# Patient Record
Sex: FEMALE | Race: BLACK OR AFRICAN AMERICAN | Employment: FULL TIME | URBAN - METROPOLITAN AREA
[De-identification: names, ages, dates, MRNs, and addresses within clinical notes are randomized per-mention and may not be internally consistent; named-entity substitution may affect disease eponyms.]

---

## 2017-04-01 ENCOUNTER — HOSPITAL ENCOUNTER (OUTPATIENT)
Dept: MAMMOGRAPHY | Age: 57
Discharge: HOME | End: 2017-04-01

## 2017-04-01 DIAGNOSIS — Z12.31 VISIT FOR SCREENING MAMMOGRAM: ICD-10-CM

## 2018-06-02 ENCOUNTER — HOSPITAL ENCOUNTER (OUTPATIENT)
Dept: MAMMOGRAPHY | Age: 58
Discharge: HOME | End: 2018-06-02
Attending: FAMILY MEDICINE | Admitting: FAMILY MEDICINE

## 2018-06-02 DIAGNOSIS — Z12.31 VISIT FOR SCREENING MAMMOGRAM: ICD-10-CM

## 2018-06-08 ENCOUNTER — HOSPITAL ENCOUNTER (OUTPATIENT)
Dept: MAMMOGRAPHY | Age: 58
Discharge: HOME | End: 2018-06-08
Attending: FAMILY MEDICINE | Admitting: FAMILY MEDICINE

## 2018-06-08 DIAGNOSIS — R92.8 ABNORMAL MAMMOGRAM: ICD-10-CM

## 2018-06-19 ENCOUNTER — TELEPHONE (OUTPATIENT)
Dept: SURGERY | Age: 58
End: 2018-06-19

## 2018-06-26 ENCOUNTER — OFFICE VISIT (OUTPATIENT)
Dept: SURGERY | Age: 58
End: 2018-06-26

## 2018-06-26 ENCOUNTER — TELEPHONE (OUTPATIENT)
Dept: SURGERY | Age: 58
End: 2018-06-26

## 2018-06-26 VITALS
HEART RATE: 75 BPM | DIASTOLIC BLOOD PRESSURE: 99 MMHG | WEIGHT: 241 LBS | OXYGEN SATURATION: 99 % | HEIGHT: 65 IN | SYSTOLIC BLOOD PRESSURE: 174 MMHG | BODY MASS INDEX: 40.15 KG/M2

## 2018-06-26 DIAGNOSIS — Z80.3 FAMILY HISTORY OF BREAST CANCER: ICD-10-CM

## 2018-06-26 DIAGNOSIS — Z91.89 AT HIGH RISK FOR BREAST CANCER: Primary | ICD-10-CM

## 2018-06-26 PROCEDURE — 99243 OFF/OP CNSLTJ NEW/EST LOW 30: CPT | Performed by: SURGERY

## 2018-06-26 RX ORDER — LISINOPRIL 40 MG/1
TABLET ORAL
COMMUNITY
Start: 2018-06-21

## 2018-06-26 ASSESSMENT — ENCOUNTER SYMPTOMS
VOMITING: 0
ABDOMINAL DISTENTION: 0
RECTAL PAIN: 0
NAUSEA: 0
SHORTNESS OF BREATH: 0
ABDOMINAL PAIN: 0
BACK PAIN: 0
TROUBLE SWALLOWING: 0
CONSTIPATION: 0
BLOOD IN STOOL: 0
DIARRHEA: 0
COUGH: 0
COLOR CHANGE: 0

## 2018-06-26 NOTE — TELEPHONE ENCOUNTER
Breast History:  History of Previous Breast Biopsy:no  Self Breast Exams Completed:yes-  Family History of Breast Cancer:yes-  Mother (48's)alive  and  sister (11) all   61 -66's   Age of Diagnosis:   Age of Death or Current Age:   Family History of Other Cancers:yes  Patient has history of colon cancer at 52   Age of Diagnosis:    Age of Death or Current Age:   Ash[employer]od[employer] Decent:no  Bra Size: 45 C    Gyne History:  : 2,   Para: 1  Age of Menarche: 15 years  Age of Menopause: N/A years   Age of first pregnancy: 12 years  Age of first live Birth: 12 years  Breast Feeding (total time): no  History of Hysterectomy / THERESA-BSO: age of 45  History of OCP's/HRT:Yes   When and how long:  Not long  History of Ovarian Cancer: no   What age:    Family History of Ovarian Cancer: no   Age of death or current age:   History of Gyne Surgery: yes- (hysterectomy)

## 2018-07-13 ENCOUNTER — TELEPHONE (OUTPATIENT)
Dept: SURGERY | Age: 58
End: 2018-07-13

## 2018-07-13 ENCOUNTER — HOSPITAL ENCOUNTER (OUTPATIENT)
Dept: MRI IMAGING | Age: 58
Discharge: HOME | End: 2018-07-13
Admitting: SURGERY

## 2018-07-13 DIAGNOSIS — Z80.3 FAMILY HISTORY OF BREAST CANCER: ICD-10-CM

## 2018-07-13 DIAGNOSIS — Z91.89 AT HIGH RISK FOR BREAST CANCER: ICD-10-CM

## 2018-07-13 DIAGNOSIS — Z91.89 OTHER SPECIFIED PERSONAL RISK FACTORS, NOT ELSEWHERE CLASSIFIED: ICD-10-CM

## 2018-07-13 RX ORDER — SODIUM CHLORIDE 9 MG/ML
INJECTION, SOLUTION INTRAVENOUS ONCE
Status: COMPLETED | OUTPATIENT
Start: 2018-07-13 | End: 2018-07-13

## 2018-07-13 RX ADMIN — SODIUM CHLORIDE: 9 INJECTION, SOLUTION INTRAVENOUS at 08:32

## 2019-06-15 ENCOUNTER — HOSPITAL ENCOUNTER (OUTPATIENT)
Dept: WOMENS IMAGING | Age: 59
Discharge: HOME | End: 2019-06-15
Payer: COMMERCIAL

## 2019-06-15 DIAGNOSIS — Z80.3 FAMILY HISTORY OF BREAST CANCER IN MOTHER: ICD-10-CM

## 2019-06-15 DIAGNOSIS — Z12.39 BREAST CANCER SCREENING: ICD-10-CM

## 2019-06-15 DIAGNOSIS — Z80.3 FAMILY HISTORY OF MALIGNANT NEOPLASM OF BREAST: ICD-10-CM

## 2019-06-15 PROCEDURE — 77063 BREAST TOMOSYNTHESIS BI: CPT

## 2020-10-07 ENCOUNTER — HOSPITAL ENCOUNTER (OUTPATIENT)
Dept: WOMENS IMAGING | Age: 60
Discharge: HOME | End: 2020-10-07
Payer: COMMERCIAL

## 2020-10-07 PROCEDURE — 77067 SCR MAMMO BI INCL CAD: CPT

## 2021-12-16 ENCOUNTER — HOSPITAL ENCOUNTER (OUTPATIENT)
Dept: WOMENS IMAGING | Age: 61
Discharge: HOME | End: 2021-12-16
Payer: COMMERCIAL

## 2021-12-16 VITALS — WEIGHT: 235 LBS | BODY MASS INDEX: 36.88 KG/M2 | HEIGHT: 67 IN

## 2021-12-16 DIAGNOSIS — Z12.31 VISIT FOR SCREENING MAMMOGRAM: ICD-10-CM

## 2021-12-16 PROCEDURE — 77067 SCR MAMMO BI INCL CAD: CPT

## 2022-05-24 ENCOUNTER — TELEPHONE (OUTPATIENT)
Dept: CARDIOLOGY CLINIC | Age: 62
End: 2022-05-24

## 2022-05-24 NOTE — TELEPHONE ENCOUNTER
MIREILLE Paulson spoke with Dr Kim Joseph about setting up new patient appt for HTN, ventricular bigeminy on EKG. Dr Kim Joseph can see pt tomorrow 5/25 at 1230 pm at [facility] office. Called patient. No answer and no availability to leave message. Called  (on [employer]) and left message on voicemail offering appt. Asked they call office back.

## 2022-05-24 NOTE — TELEPHONE ENCOUNTER
Dr Jose Ratliff will be in meetings tomorrow morning. He has no morning availability coming up in the near future.

## 2022-05-24 NOTE — TELEPHONE ENCOUNTER
Called and spoke to the pt pt would like to know if she can come in earlier as she lives down the street from [facility] and she works in [employer].  Pt stated it would be easier come in on her way to work even if she comes in at 7:30am.    Pls advise thank you

## 2022-06-03 ENCOUNTER — OFFICE VISIT (OUTPATIENT)
Dept: CARDIOLOGY CLINIC | Age: 62
End: 2022-06-03
Payer: COMMERCIAL

## 2022-06-03 VITALS
WEIGHT: 247.4 LBS | HEART RATE: 69 BPM | HEIGHT: 67 IN | DIASTOLIC BLOOD PRESSURE: 90 MMHG | OXYGEN SATURATION: 99 % | SYSTOLIC BLOOD PRESSURE: 146 MMHG | BODY MASS INDEX: 38.83 KG/M2

## 2022-06-03 DIAGNOSIS — I49.3 PVC (PREMATURE VENTRICULAR CONTRACTION): ICD-10-CM

## 2022-06-03 DIAGNOSIS — G47.30 SLEEP APNEA, UNSPECIFIED TYPE: ICD-10-CM

## 2022-06-03 DIAGNOSIS — I45.10 RBBB: Primary | ICD-10-CM

## 2022-06-03 DIAGNOSIS — I10 PRIMARY HYPERTENSION: ICD-10-CM

## 2022-06-03 PROCEDURE — 93000 ELECTROCARDIOGRAM COMPLETE: CPT | Performed by: INTERNAL MEDICINE

## 2022-06-03 PROCEDURE — 99204 OFFICE O/P NEW MOD 45 MIN: CPT | Performed by: INTERNAL MEDICINE

## 2022-06-03 RX ORDER — ATORVASTATIN CALCIUM 20 MG/1
TABLET, FILM COATED ORAL
COMMUNITY
Start: 2022-05-03

## 2022-06-03 RX ORDER — MULTIVIT WITH CALCIUM,IRON,MIN
1 TABLET ORAL DAILY
COMMUNITY

## 2022-06-03 NOTE — PROGRESS NOTES
[facility]  Cardiac Consult     Referring Provider:  RASHIDA Saleh     Chief Complaint   Patient presents with    New Patient    Hypertension        History of Present Illness:  58 y.o. female seen in consultation at the request of Danielle Butts for HTN and abnormal EKG. She has a h/o obesity and HTN. Her BP is generally well controlled. She started taking GoLo to try to lose weight. After she was on the for one month she went to her PCP for a routine visit. She was found to be hypertensive. She had an associated headache. No other exacerbating or relieving factors. She also had an irregular heart beat so an EKG was performed showing RBBB with bigeminal PVC's. She was sent here for evaluation. She immediately stopped the Golo. He headache and heart skipping resolved. She now feels well. Mild dyspnea with exertion. She does have sleep apnea and uses a CPAP. Past Medical History:   has a past medical history of Cancer [facility]), [facility] and Hypertension. Surgical History:   has a past surgical history that includes Tunneled venous port placement; Cholecystectomy; Colon surgery (2009); and Hysterectomy. Social History:  Social History     Tobacco Use    Smoking status: Never Smoker    Smokeless tobacco: Never Used   Substance Use Topics    Alcohol use: No        Family History:  family history includes Breast Cancer in her maternal aunt, maternal aunt, maternal aunt, maternal aunt, maternal aunt, maternal aunt, maternal aunt, and mother. Allergies:  Patient has no known allergies. Home Medications:  Prior to Visit Medications    Medication Sig Taking?  Authorizing Provider   atorvastatin (LIPITOR) 20 MG tablet TAKE 1 TABLET BY MOUTH ONCE DAILY FOR 90 DAYS Yes Historical Provider, MD   Multiple Vitamins-Minerals (MULTIPLE VITAMINS/WOMENS) TABS Take 1 tablet by mouth daily Yes Historical Provider, MD   metoprolol tartrate (LOPRESSOR) 25 MG tablet TAKE 1 TABLET BY MOUTH TWICE DAILY FOR 30 DAYS Yes Historical Provider, MD   lisinopril (PRINIVIL;ZESTRIL) 40 MG tablet  Yes Historical Provider, MD   Vitamin D, Ergocalciferol, 52641 UNIT CAPS Take  by mouth. One tablet by mouth weekly for four weeks then once a month  Yes Historical Provider, MD       [x] Medications and dosages reviewed. ROS:  [x]Full ROS obtained and negative except as mentioned in HPI      PHYSICAL EXAMINATION:  Vitals:    06/03/22 1000 06/03/22 1010   BP: (!) 148/90 (!) 146/90   Site: Left Upper Arm Right Upper Arm   Position: Sitting Sitting   Cuff Size: Large Adult Large Adult   Pulse: 69    SpO2: 99%    Weight: 247 lb 6.4 oz (112.2 kg)    Height: 5' 7\" (1.702 m)         · GENERAL: Well developed, well nourished, No acute distress  · NEUROLOGICAL: Alert and oriented  · PSYCH: Calm affect  · SKIN: Warm and dry, No visible rash,   · EYES: Pupils equal and round, Sclera non-icteric,   · HENT:  External ears and nose unremarkable, mucus membranes moist  · MUSCULOSKELETAL: Normal cephalic, neck supple  · CAROTID: Normal upstroke, no bruits  · CARDIAC: JVP normal, Normal PMI, regular rate and rhythm, normal S1S2, no murmur, rub, or gallop  · RESPIRATORY: Normal respiratory effort, clear to auscultation bilaterally  · EXTREMITIES: No LEedema  · GASTROINTESTINAL: normal bowel sounds, soft, non-tender, No hepatomegaly     EKG  5/24- NSR, RBBB, Bigeminal PVC's  Today-NSR, RBBB      ASSESSMENT:    PVC's:  Resolved. Likely exacerbated by GoLo  Resolved with stopping Golo  Follow    HTN:  Borderline today  Has been normal at home  Follow on lisinopril and lopressor    Sleep Apnea:  Continue CPAP    RBBB/ SHAW:  With Sleep apnea.   Check ECHO for right heart issues        PLAN:  Same meds  Follow BP at home  Stay off of Golo  F/u with ECHO    Thank you for allowing me to participate in the care of this individual.      Maggie Gaytan M.D., [facility]

## 2023-01-24 ENCOUNTER — HOSPITAL ENCOUNTER (OUTPATIENT)
Dept: WOMENS IMAGING | Age: 63
Discharge: HOME | End: 2023-01-24
Payer: COMMERCIAL

## 2023-01-24 VITALS — WEIGHT: 220 LBS | BODY MASS INDEX: 34.53 KG/M2 | HEIGHT: 67 IN

## 2023-01-24 DIAGNOSIS — Z12.31 VISIT FOR SCREENING MAMMOGRAM: ICD-10-CM

## 2023-01-24 PROCEDURE — 77063 BREAST TOMOSYNTHESIS BI: CPT

## 2023-02-22 ENCOUNTER — TELEPHONE (OUTPATIENT)
Dept: WOMENS IMAGING | Age: 63
End: 2023-02-22

## 2023-02-22 ENCOUNTER — HOSPITAL ENCOUNTER (OUTPATIENT)
Dept: WOMENS IMAGING | Age: 63
Discharge: HOME | End: 2023-02-22
Payer: COMMERCIAL

## 2023-02-22 ENCOUNTER — PRE-PROCEDURE TELEPHONE (OUTPATIENT)
Dept: WOMENS IMAGING | Age: 63
End: 2023-02-22

## 2023-02-22 ENCOUNTER — HOSPITAL ENCOUNTER (OUTPATIENT)
Dept: ULTRASOUND IMAGING | Age: 63
Discharge: HOME | End: 2023-02-22
Payer: COMMERCIAL

## 2023-02-22 DIAGNOSIS — R92.8 ABNORMAL MAMMOGRAM: ICD-10-CM

## 2023-02-22 DIAGNOSIS — R92.8 OTHER ABNORMAL AND INCONCLUSIVE FINDINGS ON DIAGNOSTIC IMAGING OF BREAST: ICD-10-CM

## 2023-02-22 PROCEDURE — 76642 ULTRASOUND BREAST LIMITED: CPT

## 2023-02-22 PROCEDURE — G0279 TOMOSYNTHESIS, MAMMO: HCPCS

## 2023-03-01 ENCOUNTER — HOSPITAL ENCOUNTER (OUTPATIENT)
Dept: ULTRASOUND IMAGING | Age: 63
Discharge: HOME | End: 2023-03-01
Payer: COMMERCIAL

## 2023-03-01 DIAGNOSIS — N63.10 MASS OF RIGHT BREAST, UNSPECIFIED QUADRANT: ICD-10-CM

## 2023-03-01 PROCEDURE — 88305 TISSUE EXAM BY PATHOLOGIST: CPT

## 2023-03-01 PROCEDURE — 19083 BX BREAST 1ST LESION US IMAG: CPT

## 2023-03-01 PROCEDURE — 2500000003 HC RX 250 WO HCPCS: Performed by: NURSE PRACTITIONER

## 2023-03-01 RX ORDER — LIDOCAINE HYDROCHLORIDE 10 MG/ML
5 INJECTION, SOLUTION EPIDURAL; INFILTRATION; INTRACAUDAL; PERINEURAL ONCE
Status: COMPLETED | OUTPATIENT
Start: 2023-03-01 | End: 2023-03-01

## 2023-03-01 RX ORDER — LIDOCAINE HYDROCHLORIDE AND EPINEPHRINE BITARTRATE 20; .01 MG/ML; MG/ML
20 INJECTION, SOLUTION SUBCUTANEOUS ONCE
Status: COMPLETED | OUTPATIENT
Start: 2023-03-01 | End: 2023-03-01

## 2023-03-01 RX ADMIN — LIDOCAINE HYDROCHLORIDE ANHYDROUS 5 ML: 10 INJECTION, SOLUTION INFILTRATION at 13:22

## 2023-03-01 RX ADMIN — LIDOCAINE HYDROCHLORIDE,EPINEPHRINE BITARTRATE 10 ML: 20; .01 INJECTION, SOLUTION INFILTRATION; PERINEURAL at 13:21

## 2023-03-01 ASSESSMENT — PAIN SCALES - GENERAL: PAINLEVEL_OUTOF10: 3

## 2023-03-01 ASSESSMENT — PAIN DESCRIPTION - ORIENTATION: ORIENTATION: RIGHT

## 2023-03-01 ASSESSMENT — PAIN DESCRIPTION - LOCATION: LOCATION: BREAST

## 2023-03-01 NOTE — PROGRESS NOTES
Patient here for breast biopsy. iN reviewed the health history, allergies and medications. Family member,  Patience Nicely drove her. Radiologist reviews procedure with patient, consent signed. Patient tolerates procedure well. Compression held. Site cleansed with chloraprep, steri strips and dry dressing applied. Ice pack in place. Reviewed discharge instructions with patient and signed copy. Patient verbalized understanding and agreed to contact Nurse Navigator with any questions. Patient A&Ox3, steady on feet and discharged home.

## 2023-03-02 ENCOUNTER — HOSPITAL ENCOUNTER (OUTPATIENT)
Dept: WOMENS IMAGING | Age: 63
Discharge: HOME | End: 2023-03-02
Payer: COMMERCIAL

## 2023-03-02 DIAGNOSIS — Z98.890 STATUS POST RIGHT BREAST BIOPSY: ICD-10-CM

## 2023-03-02 PROCEDURE — 77065 DX MAMMO INCL CAD UNI: CPT

## 2023-03-06 ENCOUNTER — TELEPHONE (OUTPATIENT)
Dept: WOMENS IMAGING | Age: 63
End: 2023-03-06

## 2023-03-06 NOTE — TELEPHONE ENCOUNTER
Imaging Navigator reviewed results of breast biopsy which showed Benign fibroadipose tissue on the pathology report. Negative for atypia or malignancy. iN reviewed radiologist follow up recommendations as a diagnostic mammogram in 6 months.

## 2023-10-18 ENCOUNTER — HOSPITAL ENCOUNTER (OUTPATIENT)
Dept: ULTRASOUND IMAGING | Age: 63
Discharge: HOME | End: 2023-10-18
Payer: COMMERCIAL

## 2023-10-18 ENCOUNTER — HOSPITAL ENCOUNTER (OUTPATIENT)
Dept: WOMENS IMAGING | Age: 63
Discharge: HOME | End: 2023-10-18
Payer: COMMERCIAL

## 2023-10-18 DIAGNOSIS — R92.8 OTHER ABNORMAL AND INCONCLUSIVE FINDINGS ON DIAGNOSTIC IMAGING OF BREAST: ICD-10-CM

## 2023-10-18 PROCEDURE — G0279 TOMOSYNTHESIS, MAMMO: HCPCS

## 2023-10-18 PROCEDURE — 76642 ULTRASOUND BREAST LIMITED: CPT

## 2024-03-23 ENCOUNTER — HOSPITAL ENCOUNTER (OUTPATIENT)
Dept: WOMENS IMAGING | Age: 64
Discharge: HOME | End: 2024-03-23
Payer: COMMERCIAL

## 2024-03-23 VITALS — BODY MASS INDEX: 38.41 KG/M2 | HEIGHT: 66 IN | WEIGHT: 239 LBS

## 2024-03-23 DIAGNOSIS — Z12.31 SCREENING MAMMOGRAM FOR BREAST CANCER: ICD-10-CM

## 2024-03-23 PROCEDURE — 77063 BREAST TOMOSYNTHESIS BI: CPT

## 2024-11-01 ENCOUNTER — TELEPHONE (OUTPATIENT)
Dept: CARDIOLOGY CLINIC | Age: 64
End: 2024-11-01

## 2024-11-01 DIAGNOSIS — R00.2 PALPITATIONS: Primary | ICD-10-CM

## 2024-11-01 DIAGNOSIS — I49.3 FREQUENT PVCS: ICD-10-CM

## 2024-11-01 NOTE — TELEPHONE ENCOUNTER
Rabia called to speak w/Anca re: the Pt changes in her EKG. Rabia did upload a copy in the Pt chart.  Please call to discuss at:  [phone], option 1.  Thank you

## 2024-11-01 NOTE — TELEPHONE ENCOUNTER
Talked to patient and NP Vandana Redd. Patient has been on Adipex and is now hypertensive and having PVC's. No symptoms.    MMK saw 2 years ago with PVC's while on dietary supplement GOLO. She was to get echo and fu at that time but did not. She has been taking Adipex most recently for weight loss and again is having PVC's. EKG sent to us from PCP office.   MMK would like her to ave  2-3 day holter to quantitate PVC's.Wants echo and OV with him.      Spoke to patient. She verbalized understanding. Please call patient to schedule 3 day monitor, echo, and OV with MMK.(Ov can be 11/25 at [facility])

## 2024-11-04 NOTE — TELEPHONE ENCOUNTER
Patient is scheduled for an echo at [facility] on 11/12/24 and an ov with MMK and 3 day holter monitor on 11/25/24 @ 8:30am.

## 2024-11-12 ENCOUNTER — TELEPHONE (OUTPATIENT)
Dept: CARDIOLOGY CLINIC | Age: 64
End: 2024-11-12

## 2024-11-12 NOTE — TELEPHONE ENCOUNTER
Patient at [facility] office getting echo today. BP elevated. 204/120.     Called patient. She states she always has elevated BP when going to appts and she was rushing around today. At home usually 120's/80's. She did take her medicine this morning. She is on her way to work and feels fine. No symptoms at all.    Advised her to check BP at home a few times a week after sitting and resting for at least 5 minutes. She will keep a log and bring to OV 11/25.

## 2024-11-12 NOTE — TELEPHONE ENCOUNTER
Please call patient and reschedule 3 day monitor. Her OV with MMK is 11/25. He will need monitor completed before appt. She can come in this week early morning for monitor placement.

## 2024-11-25 ENCOUNTER — OFFICE VISIT (OUTPATIENT)
Dept: CARDIOLOGY CLINIC | Age: 64
End: 2024-11-25
Payer: COMMERCIAL

## 2024-11-25 VITALS
WEIGHT: 230 LBS | BODY MASS INDEX: 36.96 KG/M2 | DIASTOLIC BLOOD PRESSURE: 88 MMHG | SYSTOLIC BLOOD PRESSURE: 138 MMHG | HEART RATE: 87 BPM | OXYGEN SATURATION: 95 % | HEIGHT: 66 IN

## 2024-11-25 DIAGNOSIS — I49.3 PVC (PREMATURE VENTRICULAR CONTRACTION): ICD-10-CM

## 2024-11-25 DIAGNOSIS — I77.89 ENLARGED AORTA (HCC): ICD-10-CM

## 2024-11-25 DIAGNOSIS — I10 PRIMARY HYPERTENSION: Primary | ICD-10-CM

## 2024-11-25 DIAGNOSIS — G47.33 OSA (OBSTRUCTIVE SLEEP APNEA): ICD-10-CM

## 2024-11-25 DIAGNOSIS — I47.19 PAT (PAROXYSMAL ATRIAL TACHYCARDIA) (HCC): ICD-10-CM

## 2024-11-25 DIAGNOSIS — I45.10 RBBB: ICD-10-CM

## 2024-11-25 PROCEDURE — 99214 OFFICE O/P EST MOD 30 MIN: CPT | Performed by: INTERNAL MEDICINE

## 2024-11-25 PROCEDURE — 3079F DIAST BP 80-89 MM HG: CPT | Performed by: INTERNAL MEDICINE

## 2024-11-25 PROCEDURE — 3075F SYST BP GE 130 - 139MM HG: CPT | Performed by: INTERNAL MEDICINE

## 2024-11-25 PROCEDURE — 93000 ELECTROCARDIOGRAM COMPLETE: CPT | Performed by: INTERNAL MEDICINE

## 2024-11-25 RX ORDER — AMLODIPINE BESYLATE 5 MG/1
1 TABLET ORAL
COMMUNITY
Start: 2024-11-19 | End: 2025-02-17

## 2024-11-25 NOTE — PROGRESS NOTES
[facility]  Cardiac Consult     Referring Provider:  Enedina Redd APN     Chief Complaint   Patient presents with    Follow-up    Hypertension        History of Present Illness:  64 y.o. female seen in f/u  for HTN, PVC's and abnormal EKG.      She was seen 2 years ago and did not f/u until now.     She has a h/o obesity and HTN. At that time she had started taking GoLo to try to lose weight.  She was found to be hypertensive. She also had an irregular heart beat so an EKG was performed showing RBBB with bigeminal PVC's. She was sent here for evaluation.  She immediately stopped the Golo. He headache and heart skipping resolved. She does have sleep apnea and uses a CPAP.    ECHO and holter ere planned but she did not follow through. She was recently started on Adipex for weight loss. She started having uncontrolled HTN. It was stopped and she was eventually placed on norvasc with BP control. Holter showed 4% PVC's.     Past Medical History:   has a past medical history of Cancer (HCC), Colon cancer (HCC), Diabetes mellitus (HCC), and Hypertension.    Surgical History:   has a past surgical history that includes Tunneled venous port placement; Cholecystectomy; Colon surgery (2009); Hysterectomy; and US BREAST BIOPSY W LOC DEVICE 1ST LESION RIGHT (Right, 3/1/2023).     Social History:  Social History     Tobacco Use    Smoking status: Never    Smokeless tobacco: Never   Substance Use Topics    Alcohol use: No        Family History:  family history includes Breast Cancer in her maternal aunt, maternal aunt, maternal aunt, maternal aunt, and mother.     Allergies:  Patient has no known allergies.     Home Medications:  Prior to Visit Medications    Medication Sig Taking? Authorizing Provider   amLODIPine (NORVASC) 5 MG tablet Take 1 tablet by mouth Every Day Yes Paula Mccall MD   atorvastatin (LIPITOR) 20 MG tablet TAKE 1 TABLET BY MOUTH ONCE DAILY FOR 90 DAYS Yes Paula Mccall MD

## 2024-11-25 NOTE — PATIENT INSTRUCTIONS
Continue current medications  Continue to use CPAP regularly and continue to work on weight loss  CT chest with contrast soon to better look at size of aorta  Follow up in 6 months

## 2024-12-10 ENCOUNTER — HOSPITAL ENCOUNTER (OUTPATIENT)
Dept: CT IMAGING | Age: 64
Discharge: HOME | End: 2024-12-10
Attending: INTERNAL MEDICINE
Payer: COMMERCIAL

## 2024-12-10 DIAGNOSIS — I77.89 ENLARGED AORTA (HCC): ICD-10-CM

## 2024-12-10 LAB
BUN SERPL-MCNC: 14 MG/DL (ref 7–20)
CREAT SERPL-MCNC: 0.9 MG/DL (ref 0.6–1.2)
GFR SERPLBLD CREATININE-BSD FMLA CKD-EPI: 71 ML/MIN/{1.73_M2}

## 2024-12-10 PROCEDURE — 82565 ASSAY OF CREATININE: CPT

## 2024-12-10 PROCEDURE — 71260 CT THORAX DX C+: CPT

## 2024-12-10 PROCEDURE — 36415 COLL VENOUS BLD VENIPUNCTURE: CPT

## 2024-12-10 PROCEDURE — 84520 ASSAY OF UREA NITROGEN: CPT

## 2024-12-10 PROCEDURE — 6360000004 HC RX CONTRAST MEDICATION: Performed by: INTERNAL MEDICINE

## 2024-12-10 RX ORDER — IOPAMIDOL 755 MG/ML
75 INJECTION, SOLUTION INTRAVASCULAR
Status: COMPLETED | OUTPATIENT
Start: 2024-12-10 | End: 2024-12-10

## 2024-12-10 RX ADMIN — IOPAMIDOL 75 ML: 755 INJECTION, SOLUTION INTRAVENOUS at 18:22

## 2024-12-11 ENCOUNTER — TELEPHONE (OUTPATIENT)
Dept: CARDIOLOGY CLINIC | Age: 64
End: 2024-12-11

## 2024-12-11 DIAGNOSIS — I25.83 CORONARY ARTERY DISEASE DUE TO LIPID RICH PLAQUE: Primary | ICD-10-CM

## 2024-12-11 DIAGNOSIS — I25.10 CORONARY ARTERY DISEASE DUE TO LIPID RICH PLAQUE: Primary | ICD-10-CM

## 2024-12-11 RX ORDER — ATORVASTATIN CALCIUM 40 MG/1
40 TABLET, FILM COATED ORAL DAILY
Qty: 90 TABLET | Refills: 4 | Status: SHIPPED | OUTPATIENT
Start: 2024-12-11

## 2024-12-11 NOTE — TELEPHONE ENCOUNTER
----- Message from Dr. Edgar Bradshaw MD sent at 12/11/2024  9:27 AM EST -----  Aorta looks fine on CT. There is some calcium in coronary arteries. Recommend more aggressive treatment of lipids. Increase lipitor to 40 mg. Recheck 3 months.    CINDY

## 2024-12-11 NOTE — TELEPHONE ENCOUNTER
Spoke to patient. Sent 40 mg dose of Lipitor to [facility]. Labs ordered. Patient verbalized understanding of results and instructions.

## 2025-02-13 ENCOUNTER — APPOINTMENT (OUTPATIENT)
Dept: GENERAL RADIOLOGY | Age: 65
DRG: 866 | End: 2025-02-13
Payer: COMMERCIAL

## 2025-02-13 ENCOUNTER — HOSPITAL ENCOUNTER (INPATIENT)
Age: 65
LOS: 4 days | Discharge: HOME | DRG: 866 | End: 2025-02-17
Attending: STUDENT IN AN ORGANIZED HEALTH CARE EDUCATION/TRAINING PROGRAM | Admitting: INTERNAL MEDICINE
Payer: COMMERCIAL

## 2025-02-13 DIAGNOSIS — J11.1 FLU: ICD-10-CM

## 2025-02-13 DIAGNOSIS — N17.9 AKI (ACUTE KIDNEY INJURY): Primary | ICD-10-CM

## 2025-02-13 DIAGNOSIS — D72.819 LEUKOPENIA, UNSPECIFIED TYPE: ICD-10-CM

## 2025-02-13 DIAGNOSIS — E86.0 DEHYDRATION: ICD-10-CM

## 2025-02-13 PROBLEM — R10.9 ABDOMINAL PAIN, VOMITING, AND DIARRHEA: Status: ACTIVE | Noted: 2025-02-13

## 2025-02-13 PROBLEM — R11.10 ABDOMINAL PAIN, VOMITING, AND DIARRHEA: Status: ACTIVE | Noted: 2025-02-13

## 2025-02-13 PROBLEM — R19.7 ABDOMINAL PAIN, VOMITING, AND DIARRHEA: Status: ACTIVE | Noted: 2025-02-13

## 2025-02-13 LAB
ALBUMIN SERPL-MCNC: 3.7 G/DL (ref 3.4–5)
ALBUMIN/GLOB SERPL: 0.8 {RATIO} (ref 1.1–2.2)
ALP SERPL-CCNC: 98 U/L (ref 40–129)
ALT SERPL-CCNC: 25 U/L (ref 10–40)
ANION GAP SERPL CALCULATED.3IONS-SCNC: 13 MMOL/L (ref 3–16)
AST SERPL-CCNC: 69 U/L (ref 15–37)
BACTERIA URNS QL MICRO: ABNORMAL /HPF
BASOPHILS # BLD: 0 K/UL (ref 0–0.2)
BASOPHILS NFR BLD: 0.6 %
BILIRUB SERPL-MCNC: 1 MG/DL (ref 0–1)
BILIRUB UR QL STRIP.AUTO: ABNORMAL
BUN SERPL-MCNC: 29 MG/DL (ref 7–20)
C DIFF TOX A+B STL QL IA: NORMAL
CALCIUM SERPL-MCNC: 9.2 MG/DL (ref 8.3–10.6)
CHLORIDE SERPL-SCNC: 103 MMOL/L (ref 99–110)
CLARITY UR: ABNORMAL
CO2 SERPL-SCNC: 21 MMOL/L (ref 21–32)
COLOR UR: ABNORMAL
CREAT SERPL-MCNC: 2 MG/DL (ref 0.6–1.2)
DEPRECATED RDW RBC AUTO: 16.1 % (ref 12.4–15.4)
EOSINOPHIL # BLD: 0 K/UL (ref 0–0.6)
EOSINOPHIL NFR BLD: 0 %
EPI CELLS #/AREA URNS HPF: ABNORMAL /HPF (ref 0–5)
FLUAV RNA RESP QL NAA+PROBE: DETECTED
FLUBV RNA RESP QL NAA+PROBE: NOT DETECTED
GFR SERPLBLD CREATININE-BSD FMLA CKD-EPI: 27 ML/MIN/{1.73_M2}
GLUCOSE SERPL-MCNC: 101 MG/DL (ref 70–99)
GLUCOSE UR STRIP.AUTO-MCNC: NEGATIVE MG/DL
HCT VFR BLD AUTO: 43.3 % (ref 36–48)
HGB BLD-MCNC: 14.1 G/DL (ref 12–16)
HGB UR QL STRIP.AUTO: NEGATIVE
KETONES UR STRIP.AUTO-MCNC: ABNORMAL MG/DL
LACTATE BLDV-SCNC: 0.6 MMOL/L (ref 0.4–1.9)
LEUKOCYTE ESTERASE UR QL STRIP.AUTO: NEGATIVE
LIPASE SERPL-CCNC: 40 U/L (ref 13–60)
LYMPHOCYTES # BLD: 0.9 K/UL (ref 1–5.1)
LYMPHOCYTES NFR BLD: 36.1 %
MAGNESIUM SERPL-MCNC: 1.99 MG/DL (ref 1.8–2.4)
MCH RBC QN AUTO: 26.3 PG (ref 26–34)
MCHC RBC AUTO-ENTMCNC: 32.5 G/DL (ref 31–36)
MCV RBC AUTO: 81.1 FL (ref 80–100)
MONOCYTES # BLD: 0.4 K/UL (ref 0–1.3)
MONOCYTES NFR BLD: 17 %
NEUTROPHILS # BLD: 1.1 K/UL (ref 1.7–7.7)
NEUTROPHILS NFR BLD: 46.3 %
NITRITE UR QL STRIP.AUTO: NEGATIVE
PH UR STRIP.AUTO: 5 [PH] (ref 5–8)
PLATELET # BLD AUTO: 120 K/UL (ref 135–450)
PMV BLD AUTO: 8.4 FL (ref 5–10.5)
POTASSIUM SERPL-SCNC: 4.5 MMOL/L (ref 3.5–5.1)
PROT SERPL-MCNC: 8.2 G/DL (ref 6.4–8.2)
PROT UR STRIP.AUTO-MCNC: 30 MG/DL
RBC # BLD AUTO: 5.34 M/UL (ref 4–5.2)
REASON FOR REJECTION: NORMAL
REJECTED TEST: NORMAL
SARS-COV-2 RNA RESP QL NAA+PROBE: NOT DETECTED
SODIUM SERPL-SCNC: 137 MMOL/L (ref 136–145)
SP GR UR STRIP.AUTO: 1.02 (ref 1–1.03)
UA COMPLETE W REFLEX CULTURE PNL UR: ABNORMAL
UA DIPSTICK W REFLEX MICRO PNL UR: YES
URN SPEC COLLECT METH UR: ABNORMAL
UROBILINOGEN UR STRIP-ACNC: 1 E.U./DL
WBC # BLD AUTO: 2.5 K/UL (ref 4–11)
WBC #/AREA URNS HPF: ABNORMAL /HPF (ref 0–5)
YEAST URNS QL MICRO: PRESENT /HPF

## 2025-02-13 PROCEDURE — 6370000000 HC RX 637 (ALT 250 FOR IP): Performed by: INTERNAL MEDICINE

## 2025-02-13 PROCEDURE — 83540 ASSAY OF IRON: CPT

## 2025-02-13 PROCEDURE — 87449 NOS EACH ORGANISM AG IA: CPT

## 2025-02-13 PROCEDURE — 83605 ASSAY OF LACTIC ACID: CPT

## 2025-02-13 PROCEDURE — 1200000000 HC SEMI PRIVATE

## 2025-02-13 PROCEDURE — 99285 EMERGENCY DEPT VISIT HI MDM: CPT

## 2025-02-13 PROCEDURE — 87324 CLOSTRIDIUM AG IA: CPT

## 2025-02-13 PROCEDURE — 99223 1ST HOSP IP/OBS HIGH 75: CPT | Performed by: HOSPITALIST

## 2025-02-13 PROCEDURE — 87636 SARSCOV2 & INF A&B AMP PRB: CPT

## 2025-02-13 PROCEDURE — 81001 URINALYSIS AUTO W/SCOPE: CPT

## 2025-02-13 PROCEDURE — 2580000003 HC RX 258: Performed by: INTERNAL MEDICINE

## 2025-02-13 PROCEDURE — 83690 ASSAY OF LIPASE: CPT

## 2025-02-13 PROCEDURE — 6360000002 HC RX W HCPCS: Performed by: INTERNAL MEDICINE

## 2025-02-13 PROCEDURE — 83735 ASSAY OF MAGNESIUM: CPT

## 2025-02-13 PROCEDURE — 2500000003 HC RX 250 WO HCPCS: Performed by: INTERNAL MEDICINE

## 2025-02-13 PROCEDURE — 2580000003 HC RX 258: Performed by: STUDENT IN AN ORGANIZED HEALTH CARE EDUCATION/TRAINING PROGRAM

## 2025-02-13 PROCEDURE — 6370000000 HC RX 637 (ALT 250 FOR IP): Performed by: STUDENT IN AN ORGANIZED HEALTH CARE EDUCATION/TRAINING PROGRAM

## 2025-02-13 PROCEDURE — 80053 COMPREHEN METABOLIC PANEL: CPT

## 2025-02-13 PROCEDURE — 96360 HYDRATION IV INFUSION INIT: CPT

## 2025-02-13 PROCEDURE — 71045 X-RAY EXAM CHEST 1 VIEW: CPT

## 2025-02-13 PROCEDURE — 85025 COMPLETE CBC W/AUTO DIFF WBC: CPT

## 2025-02-13 PROCEDURE — 83550 IRON BINDING TEST: CPT

## 2025-02-13 RX ORDER — SODIUM CHLORIDE 9 MG/ML
INJECTION, SOLUTION INTRAVENOUS PRN
Status: DISCONTINUED | OUTPATIENT
Start: 2025-02-13 | End: 2025-02-17 | Stop reason: HOSPADM

## 2025-02-13 RX ORDER — SODIUM CHLORIDE 0.9 % (FLUSH) 0.9 %
5-40 SYRINGE (ML) INJECTION PRN
Status: DISCONTINUED | OUTPATIENT
Start: 2025-02-13 | End: 2025-02-17 | Stop reason: HOSPADM

## 2025-02-13 RX ORDER — ATORVASTATIN CALCIUM 40 MG/1
40 TABLET, FILM COATED ORAL NIGHTLY
Status: DISCONTINUED | OUTPATIENT
Start: 2025-02-14 | End: 2025-02-17 | Stop reason: HOSPADM

## 2025-02-13 RX ORDER — ACETAMINOPHEN 650 MG/1
650 SUPPOSITORY RECTAL EVERY 6 HOURS PRN
Status: DISCONTINUED | OUTPATIENT
Start: 2025-02-13 | End: 2025-02-17 | Stop reason: HOSPADM

## 2025-02-13 RX ORDER — METOPROLOL TARTRATE 25 MG/1
25 TABLET, FILM COATED ORAL 2 TIMES DAILY
Status: DISCONTINUED | OUTPATIENT
Start: 2025-02-13 | End: 2025-02-17 | Stop reason: HOSPADM

## 2025-02-13 RX ORDER — SODIUM CHLORIDE 9 MG/ML
INJECTION, SOLUTION INTRAVENOUS CONTINUOUS
Status: DISCONTINUED | OUTPATIENT
Start: 2025-02-13 | End: 2025-02-16

## 2025-02-13 RX ORDER — AMLODIPINE BESYLATE 5 MG/1
2.5 TABLET ORAL NIGHTLY
Status: DISCONTINUED | OUTPATIENT
Start: 2025-02-13 | End: 2025-02-13

## 2025-02-13 RX ORDER — LOPERAMIDE HYDROCHLORIDE 2 MG/1
2 CAPSULE ORAL 4 TIMES DAILY PRN
Status: DISCONTINUED | OUTPATIENT
Start: 2025-02-13 | End: 2025-02-17 | Stop reason: HOSPADM

## 2025-02-13 RX ORDER — METOPROLOL TARTRATE 25 MG/1
25 TABLET, FILM COATED ORAL 2 TIMES DAILY
Status: DISCONTINUED | OUTPATIENT
Start: 2025-02-13 | End: 2025-02-13

## 2025-02-13 RX ORDER — AMLODIPINE BESYLATE 5 MG/1
5 TABLET ORAL DAILY
Status: DISCONTINUED | OUTPATIENT
Start: 2025-02-13 | End: 2025-02-17

## 2025-02-13 RX ORDER — MULTIVIT-MIN/IRON/FOLIC/HRB186 3.3 MG-25
2 TABLET ORAL DAILY
COMMUNITY

## 2025-02-13 RX ORDER — ACETAMINOPHEN 325 MG/1
650 TABLET ORAL EVERY 6 HOURS PRN
Status: DISCONTINUED | OUTPATIENT
Start: 2025-02-13 | End: 2025-02-17 | Stop reason: HOSPADM

## 2025-02-13 RX ORDER — 0.9 % SODIUM CHLORIDE 0.9 %
1000 INTRAVENOUS SOLUTION INTRAVENOUS ONCE
Status: COMPLETED | OUTPATIENT
Start: 2025-02-13 | End: 2025-02-13

## 2025-02-13 RX ORDER — ENOXAPARIN SODIUM 100 MG/ML
30 INJECTION SUBCUTANEOUS 2 TIMES DAILY
Status: DISCONTINUED | OUTPATIENT
Start: 2025-02-13 | End: 2025-02-17 | Stop reason: HOSPADM

## 2025-02-13 RX ORDER — POLYETHYLENE GLYCOL 3350 17 G/17G
17 POWDER, FOR SOLUTION ORAL DAILY PRN
Status: DISCONTINUED | OUTPATIENT
Start: 2025-02-13 | End: 2025-02-17 | Stop reason: HOSPADM

## 2025-02-13 RX ORDER — SODIUM CHLORIDE 0.9 % (FLUSH) 0.9 %
5-40 SYRINGE (ML) INJECTION EVERY 12 HOURS SCHEDULED
Status: DISCONTINUED | OUTPATIENT
Start: 2025-02-13 | End: 2025-02-17 | Stop reason: HOSPADM

## 2025-02-13 RX ADMIN — METOPROLOL TARTRATE 25 MG: 25 TABLET, FILM COATED ORAL at 22:22

## 2025-02-13 RX ADMIN — SODIUM CHLORIDE, PRESERVATIVE FREE 10 ML: 5 INJECTION INTRAVENOUS at 22:22

## 2025-02-13 RX ADMIN — LOPERAMIDE HYDROCHLORIDE 2 MG: 2 CAPSULE ORAL at 22:22

## 2025-02-13 RX ADMIN — ACETAMINOPHEN 650 MG: 325 TABLET ORAL at 22:22

## 2025-02-13 RX ADMIN — SODIUM CHLORIDE: 9 INJECTION, SOLUTION INTRAVENOUS at 18:22

## 2025-02-13 RX ADMIN — AMLODIPINE BESYLATE 5 MG: 5 TABLET ORAL at 18:23

## 2025-02-13 RX ADMIN — SODIUM CHLORIDE 1000 ML: 9 INJECTION, SOLUTION INTRAVENOUS at 14:16

## 2025-02-13 RX ADMIN — ENOXAPARIN SODIUM 30 MG: 100 INJECTION SUBCUTANEOUS at 22:22

## 2025-02-13 ASSESSMENT — PAIN - FUNCTIONAL ASSESSMENT: PAIN_FUNCTIONAL_ASSESSMENT: NONE - DENIES PAIN

## 2025-02-13 ASSESSMENT — LIFESTYLE VARIABLES
HOW OFTEN DO YOU HAVE A DRINK CONTAINING ALCOHOL: NEVER
HOW OFTEN DO YOU HAVE A DRINK CONTAINING ALCOHOL: NEVER
HOW MANY STANDARD DRINKS CONTAINING ALCOHOL DO YOU HAVE ON A TYPICAL DAY: PATIENT DOES NOT DRINK
HOW MANY STANDARD DRINKS CONTAINING ALCOHOL DO YOU HAVE ON A TYPICAL DAY: PATIENT DOES NOT DRINK

## 2025-02-13 NOTE — ED PROVIDER NOTES
[facility] EMERGENCY DEPARTMENT  EMERGENCY DEPARTMENT ENCOUNTER      Pt Name: Renita Brasher  MRN: [id number]  Birthdate 1960  Date of evaluation: 2/13/2025  Provider: Colin Hough MD    CHIEF COMPLAINT       Chief Complaint   Patient presents with    Diarrhea     Patient arrives through triage with complaints of diarrhea. Reports that it has been ongoing for several days. Reports seeing PCP and being negative for covid/flu but continues to have symptoms.          HISTORY OF PRESENT ILLNESS   (Location/Symptom, Timing/Onset, Context/Setting, Quality, Duration, Modifying Factors, Severity)  Note limiting factors.   Renita Brasher is a 65 y.o. female who presents to the emergency department with profuse watery diarrhea ongoing since Sunday.  Patient reports about 5 episodes per day of profuse watery diarrhea anytime she goes to urinate.  Also with cough and congestion.  Was seen by her primary care doctor a few days ago, tested negative for COVID and flu at that time.  She is not having abdominal discomfort with this.  No vomiting.  Is having chills.  Has been in remission in the setting of colon cancer for at least 25 years.  Is not currently on antibiotic therapy.      Chart reviewed: pt with hx of PMHx of colon cancer, DM, JUSTIN  Nursing Notes were reviewed.    REVIEW OF SYSTEMS    (2-9 systems for level 4, 10 or more for level 5)     Review of Systems    Except as noted above the remainder of the review of systems was reviewed and negative.       PAST MEDICAL HISTORY     Past Medical History:   Diagnosis Date    Cancer (HCC)     Colon cancer (HCC)     Diabetes mellitus (HCC)     Hypertension          SURGICAL HISTORY       Past Surgical History:   Procedure Laterality Date    CHOLECYSTECTOMY      COLON SURGERY  2009    HYSTERECTOMY (CERVIX STATUS UNKNOWN)      TUNNELED VENOUS PORT PLACEMENT      US BREAST BIOPSY W LOC DEVICE 1ST LESION RIGHT Right 3/1/2023    US BREAST BIOPSY W LOC DEVICE 1ST LESION

## 2025-02-13 NOTE — PROGRESS NOTES
Pharmacy Home Medication Reconciliation Note    A medication reconciliation has been completed for Renita Brasher 1960    Pharmacy: [facility] [address] [place], [place]  Information provided by: patient    The patient's home medication list is as follows:  No current facility-administered medications on file prior to encounter.     Current Outpatient Medications on File Prior to Encounter   Medication Sig Dispense Refill    Multiple Vitamins-Minerals (HAIR SKIN & NAILS ADVANCED) TABS Take 2 tablets by mouth daily      atorvastatin (LIPITOR) 40 MG tablet Take 1 tablet by mouth daily 90 tablet 4    Multiple Vitamins-Minerals (MULTIPLE VITAMINS/WOMENS) TABS Take 1 tablet by mouth daily      metoprolol tartrate (LOPRESSOR) 25 MG tablet Take 1 tablet by mouth 2 times daily      lisinopril (PRINIVIL;ZESTRIL) 40 MG tablet Take 1 tablet by mouth daily      amLODIPine (NORVASC) 5 MG tablet Take 1 tablet by mouth Every Day (Patient not taking: Reported on 2/13/2025)      Vitamin D, Ergocalciferol, 74887 UNIT CAPS Take  by mouth. One tablet by mouth weekly for four weeks then once a month  (Patient not taking: Reported on 2/13/2025)         Patient is no longer taking amlodipine or vitamin D.    Of note, patient took all AM meds prior to ED arrival.    Timing of last doses updated.    Thank you,  Gabbie Casey CPhT

## 2025-02-13 NOTE — PROGRESS NOTES
Patient seen in ED, room 14.  Admission completed with the following exceptions:  4 Eyes Assessment, Immunizations, Vaccines, Rights and Responsibilities, Orientation to room, Plan of Care, Education/Learning Assessment and Education Plan, white board, height and weight, pain assessment and head to toe assessment.  Patient is alert and oriented X 4.  Patient lives in a condo with her  and is being admitted for YEHUDA.  Home Medication reconciliation will be/has been completed by Pharmacy Staff.  All patient's and/or family's questions answered.

## 2025-02-13 NOTE — PROGRESS NOTES
4 Eyes Skin Assessment     NAME:  Renita Brasher  YOB: 1960  MEDICAL RECORD NUMBER:  [id number]    The patient is being assessed for  Admission    I agree that at least one RN has performed a thorough Head to Toe Skin Assessment on the patient. ALL assessment sites listed below have been assessed.      Areas assessed by both nurses:    Head, Face, Ears, Shoulders, Back, Chest, Arms, Elbows, Hands, Sacrum. Buttock, Coccyx, Ischium, Legs. Feet and Heels, and Under Medical Devices         Does the Patient have a Wound? No noted wound(s)       Aidan Prevention initiated by RN: No  Wound Care Orders initiated by RN: No    Pressure Injury (Stage 3,4, Unstageable, DTI, NWPT, and Complex wounds) if present, place Wound referral order by RN under : No    New Ostomies, if present place, Ostomy referral order under : No     Nurse 1 eSignature: Electronically signed by Key Hughes RN on 2/13/25 at 6:31 PM EST    **SHARE this note so that the co-signing nurse can place an eSignature**    Nurse 2 eSignature: Electronically signed by Mikael Oropeza RN on 2/13/25 at 6:44 PM EST

## 2025-02-13 NOTE — CONSULTS
Nephrology Consult Note                                                                                                                                                                                                                                                                                                                                                               Office : [phone]     Fax :[phone]    Patient's Name: Renita Brasher  3:42 PM  2/13/2025    Reason for Consult:  YEHUDA  Requesting Physician:  Enedina Redd APN  Chief Complaint:    Chief Complaint   Patient presents with    Diarrhea     Patient arrives through triage with complaints of diarrhea. Reports that it has been ongoing for several days. Reports seeing PCP and being negative for covid/flu but continues to have symptoms.        Assessment/Plan     # Influenza A bronchitis and viral prodrome and acute systemic sickness    # YEHUDA, non-oliguric   - Likely pre-renal from acute sickness, diarrhea   - HOLD lisinopril   - IV hydration   - Creatinine will improve by tomorrow, will follow   - Electrolytes and volume stable     # HTN  - Well controlled  - Continue Amlodipine and Metoprolol as per home dose   - Monitor       History of Present Ilness:    Renita Brasher is a 65 y.o. female  who presents to the emergency department with profuse watery diarrhea ongoing since Sunday.  Patient reports about 5 episodes per day of profuse watery diarrhea anytime she goes to urinate.  Also with cough and congestion.  Was seen by her primary care doctor a few days ago, tested negative for COVID and flu at that time.  She is not having abdominal discomfort with this.  No vomiting.  Is having chills. Has been in remission in the setting of colon cancer for at least 25 years. Is not currently on antibiotic therapy.      Interval hx     Past Medical History:   Diagnosis Date    Cancer (HCC)     Colon cancer (HCC)     Diabetes mellitus (HCC)     Hypertension

## 2025-02-13 NOTE — PROGRESS NOTES
Pt admitted to 3A, VSS. Denies pain at this time. Oriented to call light and room, POC discussed. All questions and concerns addressed, no further needs at this time. Bed locked in lowest position. Ambulates independently.

## 2025-02-13 NOTE — ED NOTES
Patient Name: Renita Brasher  : 1960 65 y.o.  MRN: [id number]  ED Room #: ED-0014/14     Chief complaint:   Chief Complaint   Patient presents with    Diarrhea     Patient arrives through triage with complaints of diarrhea. Reports that it has been ongoing for several days. Reports seeing PCP and being negative for covid/flu but continues to have symptoms.      Hospital Problem/Diagnosis:   Hospital Problems             Last Modified POA    * (Principal) YEHUDA (acute kidney injury) ([facility]) 2025 Yes         O2 Flow Rate:O2 Device: None (Room air)   (if applicable)  Cardiac Rhythm:   (if applicable)  Active LDA's:   Peripheral IV 25 Left Antecubital (Active)            How does patient ambulate? Low Fall Risk (Ambulates by themselves without support    2. How does patient take pills? Unknown, no oral medications were given in the Emergency Department    3. Is patient alert? Alert    4. Is patient oriented? To Person, To Place, To Time, and To Situation    5.   Patient arrived from:  home  Facility Name: ___________________________________________    6. If patient is disoriented or from a Skill Nursing Facility has family been notified of admission?  N/a    7. Patient belongings? Belongings: Cell Phone and Clothing    Disposition of belongings? Kept with Patient     8. Any specific patient or family belongings/needs/dynamics?   a. N/a    9. Miscellaneous comments/pending orders?  a. Inpatient orders      If there are any additional questions please reach out to the Emergency Department.

## 2025-02-14 LAB
ANION GAP SERPL CALCULATED.3IONS-SCNC: 9 MMOL/L (ref 3–16)
BASOPHILS # BLD: 0 K/UL (ref 0–0.2)
BASOPHILS NFR BLD: 1 %
BUN SERPL-MCNC: 27 MG/DL (ref 7–20)
CALCIUM SERPL-MCNC: 8.3 MG/DL (ref 8.3–10.6)
CHLORIDE SERPL-SCNC: 107 MMOL/L (ref 99–110)
CO2 SERPL-SCNC: 23 MMOL/L (ref 21–32)
CREAT SERPL-MCNC: 1.4 MG/DL (ref 0.6–1.2)
DEPRECATED RDW RBC AUTO: 15.8 % (ref 12.4–15.4)
EOSINOPHIL # BLD: 0 K/UL (ref 0–0.6)
EOSINOPHIL NFR BLD: 0 %
FOLATE SERPL-MCNC: 15.6 NG/ML (ref 4.78–24.2)
GFR SERPLBLD CREATININE-BSD FMLA CKD-EPI: 42 ML/MIN/{1.73_M2}
GLUCOSE BLD-MCNC: 108 MG/DL (ref 70–99)
GLUCOSE SERPL-MCNC: 106 MG/DL (ref 70–99)
HCT VFR BLD AUTO: 35.9 % (ref 36–48)
HGB BLD-MCNC: 11.7 G/DL (ref 12–16)
IRON SATN MFR SERPL: 16 % (ref 15–50)
IRON SERPL-MCNC: 40 UG/DL (ref 37–145)
LYMPHOCYTES # BLD: 0.9 K/UL (ref 1–5.1)
LYMPHOCYTES NFR BLD: 37 %
MCH RBC QN AUTO: 26.5 PG (ref 26–34)
MCHC RBC AUTO-ENTMCNC: 32.7 G/DL (ref 31–36)
MCV RBC AUTO: 81.3 FL (ref 80–100)
MONOCYTES # BLD: 0.4 K/UL (ref 0–1.3)
MONOCYTES NFR BLD: 15.7 %
NEUTROPHILS # BLD: 1.1 K/UL (ref 1.7–7.7)
NEUTROPHILS NFR BLD: 46.3 %
PERFORMED ON: ABNORMAL
PLATELET # BLD AUTO: 103 K/UL (ref 135–450)
PMV BLD AUTO: 8.6 FL (ref 5–10.5)
POTASSIUM SERPL-SCNC: 4.2 MMOL/L (ref 3.5–5.1)
RBC # BLD AUTO: 4.42 M/UL (ref 4–5.2)
SODIUM SERPL-SCNC: 139 MMOL/L (ref 136–145)
TIBC SERPL-MCNC: 252 UG/DL (ref 260–445)
VIT B12 SERPL-MCNC: 710 PG/ML (ref 211–911)
WBC # BLD AUTO: 2.4 K/UL (ref 4–11)

## 2025-02-14 PROCEDURE — 1200000000 HC SEMI PRIVATE

## 2025-02-14 PROCEDURE — 97530 THERAPEUTIC ACTIVITIES: CPT

## 2025-02-14 PROCEDURE — 82746 ASSAY OF FOLIC ACID SERUM: CPT

## 2025-02-14 PROCEDURE — 6360000002 HC RX W HCPCS: Performed by: INTERNAL MEDICINE

## 2025-02-14 PROCEDURE — 97116 GAIT TRAINING THERAPY: CPT

## 2025-02-14 PROCEDURE — 6370000000 HC RX 637 (ALT 250 FOR IP): Performed by: INTERNAL MEDICINE

## 2025-02-14 PROCEDURE — 36415 COLL VENOUS BLD VENIPUNCTURE: CPT

## 2025-02-14 PROCEDURE — 99233 SBSQ HOSP IP/OBS HIGH 50: CPT | Performed by: HOSPITALIST

## 2025-02-14 PROCEDURE — 97165 OT EVAL LOW COMPLEX 30 MIN: CPT

## 2025-02-14 PROCEDURE — 6370000000 HC RX 637 (ALT 250 FOR IP): Performed by: STUDENT IN AN ORGANIZED HEALTH CARE EDUCATION/TRAINING PROGRAM

## 2025-02-14 PROCEDURE — 85025 COMPLETE CBC W/AUTO DIFF WBC: CPT

## 2025-02-14 PROCEDURE — 82607 VITAMIN B-12: CPT

## 2025-02-14 PROCEDURE — 97535 SELF CARE MNGMENT TRAINING: CPT

## 2025-02-14 PROCEDURE — 80048 BASIC METABOLIC PNL TOTAL CA: CPT

## 2025-02-14 PROCEDURE — 2580000003 HC RX 258: Performed by: INTERNAL MEDICINE

## 2025-02-14 PROCEDURE — 2500000003 HC RX 250 WO HCPCS: Performed by: INTERNAL MEDICINE

## 2025-02-14 PROCEDURE — 97161 PT EVAL LOW COMPLEX 20 MIN: CPT

## 2025-02-14 RX ORDER — ONDANSETRON 2 MG/ML
4 INJECTION INTRAMUSCULAR; INTRAVENOUS EVERY 6 HOURS PRN
Status: DISCONTINUED | OUTPATIENT
Start: 2025-02-14 | End: 2025-02-17 | Stop reason: HOSPADM

## 2025-02-14 RX ORDER — GUAIFENESIN/DEXTROMETHORPHAN 100-10MG/5
5 SYRUP ORAL EVERY 4 HOURS PRN
Status: DISCONTINUED | OUTPATIENT
Start: 2025-02-14 | End: 2025-02-17 | Stop reason: HOSPADM

## 2025-02-14 RX ADMIN — ONDANSETRON 4 MG: 2 INJECTION, SOLUTION INTRAMUSCULAR; INTRAVENOUS at 13:07

## 2025-02-14 RX ADMIN — METOPROLOL TARTRATE 25 MG: 25 TABLET, FILM COATED ORAL at 20:57

## 2025-02-14 RX ADMIN — ENOXAPARIN SODIUM 30 MG: 100 INJECTION SUBCUTANEOUS at 08:48

## 2025-02-14 RX ADMIN — SODIUM CHLORIDE: 9 INJECTION, SOLUTION INTRAVENOUS at 17:28

## 2025-02-14 RX ADMIN — METOPROLOL TARTRATE 25 MG: 25 TABLET, FILM COATED ORAL at 08:49

## 2025-02-14 RX ADMIN — SODIUM CHLORIDE, PRESERVATIVE FREE 10 ML: 5 INJECTION INTRAVENOUS at 20:58

## 2025-02-14 RX ADMIN — GUAIFENESIN AND DEXTROMETHORPHAN 5 ML: 100; 10 SYRUP ORAL at 17:30

## 2025-02-14 RX ADMIN — SODIUM CHLORIDE: 9 INJECTION, SOLUTION INTRAVENOUS at 05:54

## 2025-02-14 RX ADMIN — GUAIFENESIN AND DEXTROMETHORPHAN 5 ML: 100; 10 SYRUP ORAL at 21:47

## 2025-02-14 RX ADMIN — GUAIFENESIN AND DEXTROMETHORPHAN 5 ML: 100; 10 SYRUP ORAL at 13:07

## 2025-02-14 RX ADMIN — LOPERAMIDE HYDROCHLORIDE 2 MG: 2 CAPSULE ORAL at 08:48

## 2025-02-14 RX ADMIN — ATORVASTATIN CALCIUM 40 MG: 40 TABLET, FILM COATED ORAL at 20:57

## 2025-02-14 RX ADMIN — AMLODIPINE BESYLATE 5 MG: 5 TABLET ORAL at 08:49

## 2025-02-14 RX ADMIN — ACETAMINOPHEN 650 MG: 325 TABLET ORAL at 20:58

## 2025-02-14 RX ADMIN — ENOXAPARIN SODIUM 30 MG: 100 INJECTION SUBCUTANEOUS at 20:57

## 2025-02-14 ASSESSMENT — PAIN SCALES - GENERAL: PAINLEVEL_OUTOF10: 4

## 2025-02-14 NOTE — ACP (ADVANCE CARE PLANNING)
Advanced Care Planning Note.    Purpose of Encounter: Advanced care planning in light of hospitalization  Parties In Attendance: Patient,    Decisional Capacity: Yes  Subjective: Patient  understand that this conversation is to address long term care goal  Objective: admited to hspital with dominic and influenza  Goals of Care Determination: Patient would pursue CPR and Intubation if required.   Code Status: full code  Time spent on Advanced care Plannin minutes  Advanced Care Planning Documents: documented patient's wishes, would like  Dmitri to make medical decisions if unable to make decisions    Zhanna Ngo MD  2025 9:17 PM

## 2025-02-14 NOTE — PROGRESS NOTES
Nephrology Note                                                                                                                                                                                                                                                                                                                                                               Office : [phone]     Fax :[phone]    Patient's Name: Renita Brasher  5:03 PM  2/14/2025    Reason for Consult:  YEHUDA  Requesting Physician:  Enedina Redd APN  Chief Complaint:    Chief Complaint   Patient presents with    Diarrhea     Patient arrives through triage with complaints of diarrhea. Reports that it has been ongoing for several days. Reports seeing PCP and being negative for covid/flu but continues to have symptoms.        Assessment/Plan     # Influenza A bronchitis and viral prodrome and acute systemic sickness    # YEHUDA, non-oliguric   - CREATININE IMPROVING WITH IV HYDRATION  - Likely pre-renal from acute sickness, diarrhea   - HOLD lisinopril   - IV hydration   - Creatinine will improve by tomorrow, will follow   - Electrolytes and volume stable     # HTN  - Well controlled  - Continue Amlodipine and Metoprolol as per home dose   - Monitor       History of Present Ilness:    Renita Brasher is a 65 y.o. female  who presents to the emergency department with profuse watery diarrhea ongoing since Sunday.  Patient reports about 5 episodes per day of profuse watery diarrhea anytime she goes to urinate.  Also with cough and congestion.  Was seen by her primary care doctor a few days ago, tested negative for COVID and flu at that time.  She is not having abdominal discomfort with this.  No vomiting.  Is having chills. Has been in remission in the setting of colon cancer for at least 25 years. Is not currently on antibiotic therapy.      Interval hx   Feel better but still not at baseline     Past Medical History:   Diagnosis Date    Cancer  OAA X3 NAD Yes  Skin: no rash, turgor wnl  Heent:  eomi, mmm  Neck: no bruits or jvd noted  Cardiovascular:  S1, S2 without m/r/g  Respiratory: CTA B without w/r/r  Abdomen:  , soft, nt, nd  Ext:  lower extremity edema No  Psychiatric: mood and affect stable   Musculoskeletal:  Rom, muscular strength intact    Data:   Labs:  CBC:   Recent Labs     02/13/25  1359 02/14/25  0719   WBC 2.5* 2.4*   HGB 14.1 11.7*   * 103*     BMP:    Recent Labs     02/13/25  1333 02/14/25  0719    139   K 4.5 4.2    107   CO2 21 23   BUN 29* 27*   CREATININE 2.0* 1.4*   GLUCOSE 101* 106*     Ca/Mg/Phos:   Recent Labs     02/13/25  1333 02/14/25  0719   CALCIUM 9.2 8.3   MG 1.99  --      Hepatic:   Recent Labs     02/13/25  1333   AST 69*   ALT 25   BILITOT 1.0   ALKPHOS 98     Troponin: No results for input(s): \"TROPONINI\" in the last 72 hours.  BNP: No results for input(s): \"BNP\" in the last 72 hours.  Lipids: No results for input(s): \"CHOL\", \"TRIG\", \"HDL\" in the last 72 hours.    Invalid input(s): \"LDLCALC\", \"LABVLDL\"  ABGs: No results for input(s): \"PHART\", \"PO2ART\", \"YJD3JNB\" in the last 72 hours.  INR: No results for input(s): \"INR\" in the last 72 hours.  UA:  Recent Labs     02/13/25  1425   COLORU DARK YELLOW*   CLARITYU CLOUDY*   GLUCOSEU Negative   BILIRUBINUR SMALL*   KETUA TRACE*   BLOODU Negative   PHUR 5.0   PROTEINU 30*   UROBILINOGEN 1.0   NITRU Negative   LEUKOCYTESUR Negative   URINETYPE NotGiven      Urine Microscopic:   Recent Labs     02/13/25  1425   BACTERIA 1+*   WBCUA 3-5     Urine Culture: No results for input(s): \"LABURIN\" in the last 72 hours.  Urine Chemistry: No results for input(s): \"CLUR\", \"LABCREA\", \"PROTEINUR\", \"NAUR\" in the last 72 hours.      IMAGING:  XR CHEST PORTABLE   Final Result   No acute process.               Medical Decision Making:  The following items were considered in medical decision making:  Discussion of patient care with other providers  Reviewed clinical lab

## 2025-02-14 NOTE — H&P
HOSPITALISTS HISTORY AND PHYSICAL    2/13/2025 9:09 PM    Patient Information:  DOLLY DELANEY is a 65 y.o. female [id number]  PCP:  Enedina Redd APN (Tel: [phone] )    Chief complaint:    Chief Complaint   Patient presents with    Diarrhea     Patient arrives through triage with complaints of diarrhea. Reports that it has been ongoing for several days. Reports seeing PCP and being negative for covid/flu but continues to have symptoms.        History of Present Illness:  Dolly Delaney is a 65 y.o. female who presented with 1 week hx of cough chilsl sweats and watery dirrhea.  No vomitting.  Thus came ot the [facility]        REVIEW OF SYSTEMS:     Cardiovascular: Negative for chest pain, palpitations     Genitourinary: Negative for polyuria, dysuria   Hematologic/Lymphatic: Negative for bleeding tendency, easy bruising  Musculoskeletal: Negative for myalgias and arthralgias  Neurologic: Negative for confusion,dysarthria.  Skin: Negative for itching,rash  Psychiatric: Negative for depression,anxiety, agitation.  Endocrine: Negative for polydipsia,polyuria,heat /cold intolerance.    Past Medical History:   has a past medical history of Cancer (HCC), Colon cancer (HCC), Diabetes mellitus (HCC), and Hypertension.     Past Surgical History:   has a past surgical history that includes Tunneled venous port placement; Cholecystectomy; Colon surgery (2009); Hysterectomy; and US BREAST BIOPSY W LOC DEVICE 1ST LESION RIGHT (Right, 3/1/2023).     Medications:  No current facility-administered medications on file prior to encounter.     Current Outpatient Medications on File Prior to Encounter   Medication Sig Dispense Refill    Multiple Vitamins-Minerals (HAIR SKIN & NAILS ADVANCED) TABS Take 2 tablets by mouth daily      atorvastatin (LIPITOR) 40 MG tablet Take 1 tablet by mouth daily 90 tablet 4    Multiple Vitamins-Minerals

## 2025-02-14 NOTE — PLAN OF CARE
Problem: Chronic Conditions and Co-morbidities  Goal: Patient's chronic conditions and co-morbidity symptoms are monitored and maintained or improved  2/13/2025 2348 by Judi Carr RN  Outcome: Progressing  Flowsheets (Taken 2/13/2025 1815 by Key Hughes RN)  Care Plan - Patient's Chronic Conditions and Co-Morbidity Symptoms are Monitored and Maintained or Improved: Monitor and assess patient's chronic conditions and comorbid symptoms for stability, deterioration, or improvement  2/13/2025 1752 by Key Hughes RN  Outcome: Progressing     Problem: Discharge Planning  Goal: Discharge to home or other facility with appropriate resources  2/13/2025 2348 by Judi Carr RN  Outcome: Progressing  Flowsheets (Taken 2/13/2025 1815 by Key Hughes RN)  Discharge to home or other facility with appropriate resources: Identify barriers to discharge with patient and caregiver  2/13/2025 1752 by Key Hughes RN  Outcome: Progressing     Problem: Safety - Adult  Goal: Free from fall injury  2/13/2025 2348 by Judi Carr RN  Outcome: Progressing  2/13/2025 1752 by Key Hughes RN  Outcome: Progressing     Problem: Respiratory - Adult  Goal: Achieves optimal ventilation and oxygenation  Outcome: Progressing     Problem: Cardiovascular - Adult  Goal: Maintains optimal cardiac output and hemodynamic stability  Outcome: Progressing  Goal: Absence of cardiac dysrhythmias or at baseline  Outcome: Progressing     Problem: Gastrointestinal - Adult  Goal: Minimal or absence of nausea and vomiting  Outcome: Progressing  Goal: Maintains or returns to baseline bowel function  Outcome: Progressing  Goal: Maintains adequate nutritional intake  Outcome: Progressing     Problem: Infection - Adult  Goal: Absence of infection at discharge  Outcome: Progressing  Goal: Absence of infection during hospitalization  Outcome: Progressing     Problem: Metabolic/Fluid and Electrolytes - Adult  Goal:  Electrolytes maintained within normal limits  Outcome: Progressing  Goal: Hemodynamic stability and optimal renal function maintained  Outcome: Progressing  Goal: Glucose maintained within prescribed range  Outcome: Progressing     Problem: Hematologic - Adult  Goal: Maintains hematologic stability  Outcome: Progressing

## 2025-02-14 NOTE — CONSULTS
[facility]   CONSULT NOTE    2/14/2025 6:31 PM    Patient Information: DOLLY DELANEY   Date of Admit:  2/13/2025  Primary Care Physician:  Enedina Redd APN  Requesting Physician:  Zhanna Ngo MD    Reason for consult:    Hematology/oncology consulted for pancytopenia    Chief complaint:    Hematology/oncology consulted for pancytopenia    History of Present Illness:    65-year-old female with a past medical history of hypertension, type 2 diabetes who presents to the hospital for cough, shortness of breath and chills.  Found to be diagnosed with influenza.  Over the course of her admission she was found to be pancytopenic for which hematology/oncology was consulted     Past Medical History:     has a past medical history of Cancer (HCC), Colon cancer (HCC), Diabetes mellitus (HCC), and Hypertension.         Past Surgical History:    Past Surgical History:   Procedure Laterality Date    CHOLECYSTECTOMY      COLON SURGERY  2009    HYSTERECTOMY (CERVIX STATUS UNKNOWN)      TUNNELED VENOUS PORT PLACEMENT      US BREAST BIOPSY W LOC DEVICE 1ST LESION RIGHT Right 3/1/2023    US BREAST BIOPSY W LOC DEVICE 1ST LESION RIGHT 3/1/2023 [facility] ULTRASOUND            Current Medications:     amLODIPine  5 mg Oral Daily    atorvastatin  40 mg Oral Nightly    metoprolol tartrate  25 mg Oral BID    sodium chloride flush  5-40 mL IntraVENous 2 times per day    enoxaparin  30 mg SubCUTAneous BID           Allergies:    No Known Allergies     Social History:    reports that she has never smoked. She has never used smokeless tobacco. She reports that she does not drink alcohol and does not use drugs.         Family History:     family history includes Breast Cancer in her maternal aunt, maternal aunt, maternal aunt, maternal aunt, and mother; COPD in her brother; Emphysema in her brother; No Known Problems in her brother.         ROS:  14 point review of systems performed negative except for as documented in the

## 2025-02-14 NOTE — PROGRESS NOTES
[facility]    2/14/2025 6:30 PM        Name: Renita Brasher .              Admitted: 2/13/2025  Primary Care Provider: Enedina Redd APN (Tel: [phone])    Is having nausea today diarrhea is improved still feels fatigue    Reviewed interval ancillary notes    Current Medications  ondansetron (ZOFRAN) injection 4 mg, Q6H PRN  guaiFENesin-dextromethorphan (ROBITUSSIN DM) 100-10 MG/5ML syrup 5 mL, Q4H PRN  loperamide (IMODIUM) capsule 2 mg, 4x Daily PRN  0.9 % sodium chloride infusion, Continuous  amLODIPine (NORVASC) tablet 5 mg, Daily  atorvastatin (LIPITOR) tablet 40 mg, Nightly  metoprolol tartrate (LOPRESSOR) tablet 25 mg, BID  sodium chloride flush 0.9 % injection 5-40 mL, 2 times per day  sodium chloride flush 0.9 % injection 5-40 mL, PRN  0.9 % sodium chloride infusion, PRN  enoxaparin Sodium (LOVENOX) injection 30 mg, BID  polyethylene glycol (GLYCOLAX) packet 17 g, Daily PRN  acetaminophen (TYLENOL) tablet 650 mg, Q6H PRN   Or  acetaminophen (TYLENOL) suppository 650 mg, Q6H PRN        Objective:  /76   Pulse 91   Temp (!) 100.5 °F (38.1 °C) (Oral)   Resp 16   Ht 1.702 m (5' 7\")   Wt 103.9 kg (229 lb)   SpO2 94%   BMI 35.87 kg/m²     Intake/Output Summary (Last 24 hours) at 2/14/2025 1830  Last data filed at 2/14/2025 1506  Gross per 24 hour   Intake 2405 ml   Output --   Net 2405 ml      Wt Readings from Last 3 Encounters:   02/14/25 103.9 kg (229 lb)   11/25/24 104.3 kg (230 lb)   11/12/24 108.4 kg (239 lb)       General appearance:  Appears comfortable. AAOx3  HEENT: atraumatic, Pupils equal, muscous membranes moist, no masses appreciated  Cardiovascular: Regular rate and rhythm no murmurs appreciated  Respiratory: CTAB no wheezing  Gastrointestinal: Abdomen soft, non-tender, BS+  EXT: no edema  Neurology: no gross focal deficts  Psychiatry: Appropriate affect. Not agitated  Skin: Warm,

## 2025-02-14 NOTE — CARE COORDINATION
Discharge Planning Note:    Chart reviewed and it appears that patient has minimal needs for discharge at this time. Risk Score 10 %     Primary Care Physician is Enedina Redd APN    Primary insurance is [facility]    Please notify case management if any discharge needs are identified.      Case management will continue to follow progress and update discharge plan as needed.   Electronically signed by LAYTON Isabel on 2/14/25 at 2:49 PM EST

## 2025-02-14 NOTE — PLAN OF CARE
Problem: Chronic Conditions and Co-morbidities  Goal: Patient's chronic conditions and co-morbidity symptoms are monitored and maintained or improved  Outcome: Progressing  Note: Pt A&OX4, /75   Pulse 82   Temp 99.8 °F (37.7 °C)   Resp 16   Ht 1.702 m (5' 7\")   Wt 103.9 kg (229 lb)   SpO2 96%   BMI 35.87 kg/m²   C/o cough and nausea today, reports loose stools are decreasing, on RA, lungs with rhonchi in upper airway, otherwise diminished, no c/o pain, prn zofran and robutussin given.  Droplet isolation continued for influenza, pt able to ambulate independently in the room with steady gait, ivf infusing per order, low po intake.

## 2025-02-14 NOTE — PROGRESS NOTES
[facility] - Inpatient Rehabilitation Department   Phone: [phone]    Physical Therapy    [x] Initial Evaluation            [] Daily Treatment Note         [x] Discharge Summary      Patient: Renita Brasher   : 1960   MRN: [id number]   Date of Service:  2025  Admitting Diagnosis: YEHUDA (acute kidney injury) (HCC)  Current Admission Summary: Per EMR, patient is a 65 y.o. female who was admitted to ED with profuse watery diarrhea ongoing for several days.  Patient reports about 5 episodes per day of diarrhea.  Also with cough, congestion, and chills. No vomiting. Was seen by her primary care doctor a few days ago, tested negative for COVID and flu at that time. Has been in remission in the setting of colon cancer for at least 25 years.  Past Medical History:  has a past medical history of Cancer (HCC), Colon cancer (HCC), Diabetes mellitus (HCC), and Hypertension.  Past Surgical History:  has a past surgical history that includes Tunneled venous port placement; Cholecystectomy; Colon surgery (); Hysterectomy; and US BREAST BIOPSY W LOC DEVICE 1ST LESION RIGHT (Right, 3/1/2023).  Discharge Recommendations: Renita Brasher scored a 24/24 on the AM-PAC short mobility form.  At this time, no further PT is recommended upon discharge due to patient at independent level.  Recommend patient returns to prior setting with prior services.    DME Required For Discharge: No new DME required  Precautions/Restrictions: low fall risk, Isolation precautions  Positional Restrictions:no positional restrictions    Pre-Admission Information   Lives With: spouse - Dmitri     Type of Home: condo  Home Layout: two level, laundry in basement, 15 stairs to 2nd level with R HR  Home Access:  1 step to enter without rails   Bathroom Layout: tub only  Toilet Height: standard height  Bathroom Equipment: grab bars in shower  Home Equipment: rolling walker, rollator - 4 wheeled walker, single point cane  Transfer

## 2025-02-14 NOTE — PROGRESS NOTES
[facility] - Inpatient Rehabilitation Department   Phone: [phone]    Occupational Therapy    [x] Initial Evaluation            [] Daily Treatment Note         [x] Discharge Summary      Patient: Renita Brasehr   : 1960   MRN: [id number]   Date of Service:  2025    Admitting Diagnosis:  YEHUDA (acute kidney injury) (HCC)  Current Admission Summary: Per EMR Renita Brasher is a 65 y.o. female who presents to the emergency department with profuse watery diarrhea ongoing since .  Patient reports about 5 episodes per day of profuse watery diarrhea anytime she goes to urinate.  Also with cough and congestion.  Was seen by her primary care doctor a few days ago, tested negative for COVID and flu at that time.  She is not having abdominal discomfort with this.  No vomiting.  Is having chills.  Has been in remission in the setting of colon cancer for at least 25 years.  Is not currently on antibiotic therapy.     Past Medical History:  has a past medical history of Cancer (HCC), Colon cancer (HCC), Diabetes mellitus (HCC), and Hypertension.  Past Surgical History:  has a past surgical history that includes Tunneled venous port placement; Cholecystectomy; Colon surgery (); Hysterectomy; and US BREAST BIOPSY W LOC DEVICE 1ST LESION RIGHT (Right, 3/1/2023).    Discharge Recommendations: Renita Brasher scored a 24/24 on the AM-PAC ADL Inpatient form.  At this time, no further OT is recommended upon discharge due to patient at independent level.  Recommend patient returns to prior setting with prior services.      DME Required For Discharge: No DME required    Precautions/Restrictions: low fall risk  Positional Restrictions:no positional restrictions    Pre-Admission Information   Lives With: spouse Ramos   Type of Home: condo  Home Layout: two level, laundry in basement, 15 stairs to 2nd level with R HR  Home Access:  1 step to enter without rails   Bathroom Layout: tub only  Toilet Height:

## 2025-02-15 LAB
ANION GAP SERPL CALCULATED.3IONS-SCNC: 8 MMOL/L (ref 3–16)
BUN SERPL-MCNC: 17 MG/DL (ref 7–20)
CALCIUM SERPL-MCNC: 8 MG/DL (ref 8.3–10.6)
CHLORIDE SERPL-SCNC: 110 MMOL/L (ref 99–110)
CO2 SERPL-SCNC: 22 MMOL/L (ref 21–32)
CREAT SERPL-MCNC: 1 MG/DL (ref 0.6–1.2)
GFR SERPLBLD CREATININE-BSD FMLA CKD-EPI: 62 ML/MIN/{1.73_M2}
GLUCOSE BLD-MCNC: 99 MG/DL (ref 70–99)
GLUCOSE SERPL-MCNC: 101 MG/DL (ref 70–99)
LDH SERPL L TO P-CCNC: 261 U/L (ref 100–190)
PERFORMED ON: NORMAL
POTASSIUM SERPL-SCNC: 4.1 MMOL/L (ref 3.5–5.1)
REASON FOR REJECTION: NORMAL
REJECTED TEST: NORMAL
SODIUM SERPL-SCNC: 140 MMOL/L (ref 136–145)

## 2025-02-15 PROCEDURE — 6360000002 HC RX W HCPCS: Performed by: INTERNAL MEDICINE

## 2025-02-15 PROCEDURE — 84630 ASSAY OF ZINC: CPT

## 2025-02-15 PROCEDURE — 80048 BASIC METABOLIC PNL TOTAL CA: CPT

## 2025-02-15 PROCEDURE — 6370000000 HC RX 637 (ALT 250 FOR IP): Performed by: INTERNAL MEDICINE

## 2025-02-15 PROCEDURE — 2500000003 HC RX 250 WO HCPCS: Performed by: INTERNAL MEDICINE

## 2025-02-15 PROCEDURE — 1200000000 HC SEMI PRIVATE

## 2025-02-15 PROCEDURE — 99232 SBSQ HOSP IP/OBS MODERATE 35: CPT | Performed by: INTERNAL MEDICINE

## 2025-02-15 PROCEDURE — 82525 ASSAY OF COPPER: CPT

## 2025-02-15 PROCEDURE — 2580000003 HC RX 258: Performed by: INTERNAL MEDICINE

## 2025-02-15 PROCEDURE — 36415 COLL VENOUS BLD VENIPUNCTURE: CPT

## 2025-02-15 PROCEDURE — 83615 LACTATE (LD) (LDH) ENZYME: CPT

## 2025-02-15 PROCEDURE — 85025 COMPLETE CBC W/AUTO DIFF WBC: CPT

## 2025-02-15 RX ADMIN — ENOXAPARIN SODIUM 30 MG: 100 INJECTION SUBCUTANEOUS at 09:41

## 2025-02-15 RX ADMIN — SODIUM CHLORIDE: 9 INJECTION, SOLUTION INTRAVENOUS at 14:15

## 2025-02-15 RX ADMIN — GUAIFENESIN AND DEXTROMETHORPHAN 5 ML: 100; 10 SYRUP ORAL at 21:36

## 2025-02-15 RX ADMIN — GUAIFENESIN AND DEXTROMETHORPHAN 5 ML: 100; 10 SYRUP ORAL at 14:17

## 2025-02-15 RX ADMIN — ATORVASTATIN CALCIUM 40 MG: 40 TABLET, FILM COATED ORAL at 21:37

## 2025-02-15 RX ADMIN — GUAIFENESIN AND DEXTROMETHORPHAN 5 ML: 100; 10 SYRUP ORAL at 09:42

## 2025-02-15 RX ADMIN — GUAIFENESIN AND DEXTROMETHORPHAN 5 ML: 100; 10 SYRUP ORAL at 01:58

## 2025-02-15 RX ADMIN — SODIUM CHLORIDE, PRESERVATIVE FREE 10 ML: 5 INJECTION INTRAVENOUS at 09:41

## 2025-02-15 RX ADMIN — METOPROLOL TARTRATE 25 MG: 25 TABLET, FILM COATED ORAL at 21:37

## 2025-02-15 RX ADMIN — ENOXAPARIN SODIUM 30 MG: 100 INJECTION SUBCUTANEOUS at 21:37

## 2025-02-15 RX ADMIN — METOPROLOL TARTRATE 25 MG: 25 TABLET, FILM COATED ORAL at 09:42

## 2025-02-15 RX ADMIN — FILGRASTIM-AAFI 300 MCG: 300 INJECTION, SOLUTION SUBCUTANEOUS at 14:11

## 2025-02-15 RX ADMIN — AMLODIPINE BESYLATE 5 MG: 5 TABLET ORAL at 09:42

## 2025-02-15 ASSESSMENT — PAIN SCALES - GENERAL: PAINLEVEL_OUTOF10: 0

## 2025-02-15 NOTE — PROGRESS NOTES
Nephrology Note                                                                                                                                                                                                                                                                                                                                                               Office : [phone]     Fax :[phone]    Patient's Name: Renita Brasher  9:01 AM  2/15/2025    Reason for Consult:  YEHUDA  Requesting Physician:  Enedina Redd APN  Chief Complaint:    Chief Complaint   Patient presents with    Diarrhea     Patient arrives through triage with complaints of diarrhea. Reports that it has been ongoing for several days. Reports seeing PCP and being negative for covid/flu but continues to have symptoms.        Assessment/Plan     # Influenza A bronchitis, viral prodrome and acute systemic sickness    # YEHUDA, non-oliguric   - Creatinine improving and now back to baseline on IVF  - Likely pre-renal from acute sickness, diarrhea   - HOLD lisinopril   - IV hydration   - Electrolytes and volume stable     # HTN  - Well controlled  - Continue Amlodipine and Metoprolol as per home dose   - Monitor       History of Present Ilness:    Renita Brasher is a 65 y.o. female  who presents to the emergency department with profuse watery diarrhea ongoing since Sunday.  Patient reports about 5 episodes per day of profuse watery diarrhea anytime she goes to urinate.  Also with cough and congestion.  Was seen by her primary care doctor a few days ago, tested negative for COVID and flu at that time.  She is not having abdominal discomfort with this.  No vomiting.  Is having chills. Has been in remission in the setting of colon cancer for at least 25 years. Is not currently on antibiotic therapy.      Interval hx     Creatinine improved and around her baseline  Bps controlled  Remains on IVF, endorses ok appetite, still very fatigued  Reports

## 2025-02-15 NOTE — PROGRESS NOTES
ONCOLOGY HEMATOLOGY CARE PROGRESS NOTE      SUBJECTIVE:    Feels okay.  Had low-grade fever.  Diarrhea better    ROS:         OBJECTIVE        Physical    VITALS:  Patient Vitals for the past 24 hrs:   BP Temp Temp src Pulse Resp SpO2   02/15/25 0937 121/75 99.2 °F (37.3 °C) Oral 83 20 97 %   02/14/25 2045 125/67 99.8 °F (37.7 °C) Oral (!) 104 16 94 %   02/14/25 1500 119/76 (!) 100.5 °F (38.1 °C) Oral 91 16 94 %       24HR INTAKE/OUTPUT:    Intake/Output Summary (Last 24 hours) at 2/15/2025 1331  Last data filed at 2/15/2025 0937  Gross per 24 hour   Intake 2165 ml   Output --   Net 2165 ml     Conscious alert oriented.    Appears comfortable.    No neck fullness.    Respiratory efforts are normal.    Abdomen is not distended.    No leg edema    No focal deficits.      DATA:  CBC:    Recent Labs     02/15/25  0724 02/14/25  0719 02/13/25  1359   WBC 2.2* 2.4* 2.5*   NEUTROABS 0.8* 1.1* 1.1*   LYMPHOPCT 53.1 37.0 36.1   RBC 4.01 4.42 5.34*   HGB 10.5* 11.7* 14.1   HCT 32.7* 35.9* 43.3   MCV 81.5 81.3 81.1   MCH 26.2 26.5 26.3   MCHC 32.1 32.7 32.5   RDW 15.6* 15.8* 16.1*   PLT 83* 103* 120*       PT/INR:  No results for input(s): \"INR\" in the last 720 hours.    Invalid input(s): \"PROT\"  PTT:  No results for input(s): \"APTT\" in the last 720 hours.    CMP:    Recent Labs     02/15/25  0724 02/14/25  0719 02/13/25  1333    139 137   K 4.1 4.2 4.5    107 103   CO2 22 23 21   GLUCOSE 101* 106* 101*   BUN 17 27* 29*   CREATININE 1.0 1.4* 2.0*   LABGLOM 62 42* 27*   CALCIUM 8.0* 8.3 9.2   AGRATIO  --   --  0.8*   BILITOT  --   --  1.0   ALKPHOS  --   --  98   ALT  --   --  25   AST  --   --  69*   MG  --   --  1.99       Lab Results   Component Value Date    CALCIUM 8.0 (L) 02/15/2025       LDH:  Recent Labs     02/15/25  0724   *       Radiology Review:  XR CHEST PORTABLE  Narrative: EXAMINATION:  ONE XRAY VIEW OF THE CHEST    2/13/2025 12:33

## 2025-02-15 NOTE — PROGRESS NOTES
Received call from lab with critical absolute neutrophil count of 0.8. Notified Dr. Knox via [employer]

## 2025-02-16 LAB
ANION GAP SERPL CALCULATED.3IONS-SCNC: 6 MMOL/L (ref 3–16)
BASOPHILS # BLD: 0 K/UL (ref 0–0.2)
BASOPHILS # BLD: 0 K/UL (ref 0–0.2)
BASOPHILS NFR BLD: 0.2 %
BASOPHILS NFR BLD: 0.3 %
BUN SERPL-MCNC: 10 MG/DL (ref 7–20)
CALCIUM SERPL-MCNC: 8.1 MG/DL (ref 8.3–10.6)
CHLORIDE SERPL-SCNC: 110 MMOL/L (ref 99–110)
CO2 SERPL-SCNC: 22 MMOL/L (ref 21–32)
CREAT SERPL-MCNC: 0.8 MG/DL (ref 0.6–1.2)
DEPRECATED RDW RBC AUTO: 15.6 % (ref 12.4–15.4)
DEPRECATED RDW RBC AUTO: 15.7 % (ref 12.4–15.4)
EOSINOPHIL # BLD: 0 K/UL (ref 0–0.6)
EOSINOPHIL # BLD: 0 K/UL (ref 0–0.6)
EOSINOPHIL NFR BLD: 0 %
EOSINOPHIL NFR BLD: 0 %
GFR SERPLBLD CREATININE-BSD FMLA CKD-EPI: 82 ML/MIN/{1.73_M2}
GLUCOSE BLD-MCNC: 124 MG/DL (ref 70–99)
GLUCOSE BLD-MCNC: 133 MG/DL (ref 70–99)
GLUCOSE BLD-MCNC: 87 MG/DL (ref 70–99)
GLUCOSE BLD-MCNC: 88 MG/DL (ref 70–99)
GLUCOSE SERPL-MCNC: 96 MG/DL (ref 70–99)
HCT VFR BLD AUTO: 31.4 % (ref 36–48)
HCT VFR BLD AUTO: 32.7 % (ref 36–48)
HGB BLD-MCNC: 10.1 G/DL (ref 12–16)
HGB BLD-MCNC: 10.5 G/DL (ref 12–16)
LYMPHOCYTES # BLD: 1.2 K/UL (ref 1–5.1)
LYMPHOCYTES # BLD: 2 K/UL (ref 1–5.1)
LYMPHOCYTES NFR BLD: 16.8 %
LYMPHOCYTES NFR BLD: 53.1 %
MCH RBC QN AUTO: 26 PG (ref 26–34)
MCH RBC QN AUTO: 26.2 PG (ref 26–34)
MCHC RBC AUTO-ENTMCNC: 32 G/DL (ref 31–36)
MCHC RBC AUTO-ENTMCNC: 32.1 G/DL (ref 31–36)
MCV RBC AUTO: 81.2 FL (ref 80–100)
MCV RBC AUTO: 81.5 FL (ref 80–100)
MONOCYTES # BLD: 0.2 K/UL (ref 0–1.3)
MONOCYTES # BLD: 0.5 K/UL (ref 0–1.3)
MONOCYTES NFR BLD: 11 %
MONOCYTES NFR BLD: 3.9 %
NEUTROPHILS # BLD: 0.8 K/UL (ref 1.7–7.7)
NEUTROPHILS # BLD: 9.3 K/UL (ref 1.7–7.7)
NEUTROPHILS NFR BLD: 35.6 %
NEUTROPHILS NFR BLD: 79.1 %
PATH INTERP BLD-IMP: YES
PERFORMED ON: ABNORMAL
PERFORMED ON: ABNORMAL
PERFORMED ON: NORMAL
PERFORMED ON: NORMAL
PLATELET # BLD AUTO: 69 K/UL (ref 135–450)
PLATELET # BLD AUTO: 83 K/UL (ref 135–450)
PMV BLD AUTO: 8.4 FL (ref 5–10.5)
PMV BLD AUTO: 8.8 FL (ref 5–10.5)
POTASSIUM SERPL-SCNC: 4.2 MMOL/L (ref 3.5–5.1)
RBC # BLD AUTO: 3.86 M/UL (ref 4–5.2)
RBC # BLD AUTO: 4.01 M/UL (ref 4–5.2)
SODIUM SERPL-SCNC: 138 MMOL/L (ref 136–145)
WBC # BLD AUTO: 11.7 K/UL (ref 4–11)
WBC # BLD AUTO: 2.2 K/UL (ref 4–11)

## 2025-02-16 PROCEDURE — 85025 COMPLETE CBC W/AUTO DIFF WBC: CPT

## 2025-02-16 PROCEDURE — 80048 BASIC METABOLIC PNL TOTAL CA: CPT

## 2025-02-16 PROCEDURE — 2500000003 HC RX 250 WO HCPCS: Performed by: INTERNAL MEDICINE

## 2025-02-16 PROCEDURE — 99232 SBSQ HOSP IP/OBS MODERATE 35: CPT | Performed by: INTERNAL MEDICINE

## 2025-02-16 PROCEDURE — 6370000000 HC RX 637 (ALT 250 FOR IP): Performed by: STUDENT IN AN ORGANIZED HEALTH CARE EDUCATION/TRAINING PROGRAM

## 2025-02-16 PROCEDURE — 1200000000 HC SEMI PRIVATE

## 2025-02-16 PROCEDURE — 6360000002 HC RX W HCPCS: Performed by: INTERNAL MEDICINE

## 2025-02-16 PROCEDURE — 6370000000 HC RX 637 (ALT 250 FOR IP): Performed by: INTERNAL MEDICINE

## 2025-02-16 RX ADMIN — GUAIFENESIN AND DEXTROMETHORPHAN 5 ML: 100; 10 SYRUP ORAL at 20:11

## 2025-02-16 RX ADMIN — METOPROLOL TARTRATE 25 MG: 25 TABLET, FILM COATED ORAL at 09:24

## 2025-02-16 RX ADMIN — SODIUM CHLORIDE, PRESERVATIVE FREE 10 ML: 5 INJECTION INTRAVENOUS at 20:12

## 2025-02-16 RX ADMIN — ENOXAPARIN SODIUM 30 MG: 100 INJECTION SUBCUTANEOUS at 09:24

## 2025-02-16 RX ADMIN — LOPERAMIDE HYDROCHLORIDE 2 MG: 2 CAPSULE ORAL at 14:48

## 2025-02-16 RX ADMIN — SODIUM CHLORIDE, PRESERVATIVE FREE 10 ML: 5 INJECTION INTRAVENOUS at 09:24

## 2025-02-16 RX ADMIN — METOPROLOL TARTRATE 25 MG: 25 TABLET, FILM COATED ORAL at 20:11

## 2025-02-16 RX ADMIN — GUAIFENESIN AND DEXTROMETHORPHAN 5 ML: 100; 10 SYRUP ORAL at 09:35

## 2025-02-16 RX ADMIN — AMLODIPINE BESYLATE 5 MG: 5 TABLET ORAL at 09:23

## 2025-02-16 RX ADMIN — ATORVASTATIN CALCIUM 40 MG: 40 TABLET, FILM COATED ORAL at 20:11

## 2025-02-16 NOTE — PROGRESS NOTES
ONCOLOGY HEMATOLOGY CARE PROGRESS NOTE      SUBJECTIVE:    Feels okay.  No fever  Had some loose bowel movement    ROS:         OBJECTIVE        Physical    VITALS:  Patient Vitals for the past 24 hrs:   BP Temp Temp src Pulse Resp SpO2 Weight   02/16/25 0916 136/78 98.2 °F (36.8 °C) Oral 74 18 94 % --   02/16/25 0333 -- -- -- -- -- -- 103.5 kg (228 lb 3.2 oz)   02/15/25 2130 138/79 98.6 °F (37 °C) Oral 77 18 95 % --       24HR INTAKE/OUTPUT:  No intake or output data in the 24 hours ending 02/16/25 1527    Conscious alert oriented.    Appears comfortable.    No neck fullness.    Respiratory efforts are normal.    Abdomen is not distended.    No leg edema    No focal deficits.      DATA:  CBC:    Recent Labs     02/16/25  0524 02/15/25  0724 02/14/25  0719 02/13/25  1359   WBC 11.7* 2.2* 2.4* 2.5*   NEUTROABS 9.3* 0.8* 1.1* 1.1*   LYMPHOPCT 16.8 53.1 37.0 36.1   RBC 3.86* 4.01 4.42 5.34*   HGB 10.1* 10.5* 11.7* 14.1   HCT 31.4* 32.7* 35.9* 43.3   MCV 81.2 81.5 81.3 81.1   MCH 26.0 26.2 26.5 26.3   MCHC 32.0 32.1 32.7 32.5   RDW 15.7* 15.6* 15.8* 16.1*   PLT 69* 83* 103* 120*       PT/INR:  No results for input(s): \"INR\" in the last 720 hours.    Invalid input(s): \"PROT\"  PTT:  No results for input(s): \"APTT\" in the last 720 hours.    CMP:    Recent Labs     02/16/25  0524 02/15/25  0724 02/14/25  0719 02/13/25  1333    140 139 137   K 4.2 4.1 4.2 4.5    110 107 103   CO2 22 22 23 21   GLUCOSE 96 101* 106* 101*   BUN 10 17 27* 29*   CREATININE 0.8 1.0 1.4* 2.0*   LABGLOM 82 62 42* 27*   CALCIUM 8.1* 8.0* 8.3 9.2   AGRATIO  --   --   --  0.8*   BILITOT  --   --   --  1.0   ALKPHOS  --   --   --  98   ALT  --   --   --  25   AST  --   --   --  69*   MG  --   --   --  1.99       Lab Results   Component Value Date    CALCIUM 8.1 (L) 02/16/2025       LDH:  Recent Labs     02/15/25  0724   *       Radiology Review:  XR CHEST PORTABLE  Narrative: EXAMINATION:  ONE

## 2025-02-16 NOTE — PROGRESS NOTES
[facility]    2/16/2025 8:24 AM        Name: Renita Brasher .              Admitted: 2/13/2025  Primary Care Provider: Enedina Redd APN (Tel: [phone])    Patient lying bed nausea vomiting improved  Reviewed interval ancillary notes    Current Medications  ondansetron (ZOFRAN) injection 4 mg, Q6H PRN  guaiFENesin-dextromethorphan (ROBITUSSIN DM) 100-10 MG/5ML syrup 5 mL, Q4H PRN  loperamide (IMODIUM) capsule 2 mg, 4x Daily PRN  0.9 % sodium chloride infusion, Continuous  amLODIPine (NORVASC) tablet 5 mg, Daily  atorvastatin (LIPITOR) tablet 40 mg, Nightly  metoprolol tartrate (LOPRESSOR) tablet 25 mg, BID  sodium chloride flush 0.9 % injection 5-40 mL, 2 times per day  sodium chloride flush 0.9 % injection 5-40 mL, PRN  0.9 % sodium chloride infusion, PRN  enoxaparin Sodium (LOVENOX) injection 30 mg, BID  polyethylene glycol (GLYCOLAX) packet 17 g, Daily PRN  acetaminophen (TYLENOL) tablet 650 mg, Q6H PRN   Or  acetaminophen (TYLENOL) suppository 650 mg, Q6H PRN        Objective:  /79   Pulse 77   Temp 98.6 °F (37 °C) (Oral)   Resp 18   Ht 1.702 m (5' 7\")   Wt 103.5 kg (228 lb 3.2 oz)   SpO2 95%   BMI 35.74 kg/m²     Intake/Output Summary (Last 24 hours) at 2/16/2025 0824  Last data filed at 2/15/2025 0937  Gross per 24 hour   Intake 240 ml   Output --   Net 240 ml      Wt Readings from Last 3 Encounters:   02/16/25 103.5 kg (228 lb 3.2 oz)   11/25/24 104.3 kg (230 lb)   11/12/24 108.4 kg (239 lb)       General appearance:  Appears comfortable. AAOx3  HEENT: atraumatic, Pupils equal, muscous membranes moist, no masses appreciated  Cardiovascular: Regular rate and rhythm no murmurs appreciated  Respiratory: CTAB no wheezing  Gastrointestinal: Abdomen soft, non-tender, BS+  EXT: no edema  Neurology: no gross focal deficts  Psychiatry: Appropriate affect. Not agitated  Skin: Warm, dry, no rashes

## 2025-02-16 NOTE — PLAN OF CARE
Problem: Chronic Conditions and Co-morbidities  Goal: Patient's chronic conditions and co-morbidity symptoms are monitored and maintained or improved  Outcome: Progressing     Problem: Discharge Planning  Goal: Discharge to home or other facility with appropriate resources  Outcome: Progressing     Problem: Safety - Adult  Goal: Free from fall injury  Outcome: Progressing     Problem: Respiratory - Adult  Goal: Achieves optimal ventilation and oxygenation  Outcome: Progressing     Problem: Cardiovascular - Adult  Goal: Maintains optimal cardiac output and hemodynamic stability  Outcome: Progressing  Goal: Absence of cardiac dysrhythmias or at baseline  Outcome: Progressing     Problem: Gastrointestinal - Adult  Goal: Minimal or absence of nausea and vomiting  Outcome: Progressing  Goal: Maintains or returns to baseline bowel function  Outcome: Progressing  Goal: Maintains adequate nutritional intake  Outcome: Progressing     Problem: Infection - Adult  Goal: Absence of infection at discharge  Outcome: Progressing  Goal: Absence of infection during hospitalization  Outcome: Progressing     Problem: Metabolic/Fluid and Electrolytes - Adult  Goal: Electrolytes maintained within normal limits  Outcome: Progressing  Goal: Hemodynamic stability and optimal renal function maintained  Outcome: Progressing  Goal: Glucose maintained within prescribed range  Outcome: Progressing     Problem: Hematologic - Adult  Goal: Maintains hematologic stability  Outcome: Progressing     Problem: Pain  Goal: Verbalizes/displays adequate comfort level or baseline comfort level  Outcome: Progressing

## 2025-02-16 NOTE — PROGRESS NOTES
Nephrology Note                                                                                                                                                                                                                                                                                                                                                               Office : [phone]     Fax :[phone]    Patient's Name: Renita Brasher  7:40 AM  2/16/2025    Reason for Consult:  YEHUDA  Requesting Physician:  Enedina Redd APN  Chief Complaint:    Chief Complaint   Patient presents with    Diarrhea     Patient arrives through triage with complaints of diarrhea. Reports that it has been ongoing for several days. Reports seeing PCP and being negative for covid/flu but continues to have symptoms.        Assessment/Plan     # Influenza A bronchitis, viral prodrome and acute systemic sickness    # YEHUDA, non-oliguric   - Creatinine improving and now back to baseline s/p IVF  - Likely pre-renal from acute sickness, diarrhea   - HOLD lisinopril   - off IVF  - Electrolytes and volume stable     # HTN  - Well controlled  - Continue Amlodipine and Metoprolol as per home dose   - Monitor       History of Present Ilness:    Renita Brasher is a 65 y.o. female  who presents to the emergency department with profuse watery diarrhea ongoing since Sunday.  Patient reports about 5 episodes per day of profuse watery diarrhea anytime she goes to urinate.  Also with cough and congestion.  Was seen by her primary care doctor a few days ago, tested negative for COVID and flu at that time.  She is not having abdominal discomfort with this.  No vomiting.  Is having chills. Has been in remission in the setting of colon cancer for at least 25 years. Is not currently on antibiotic therapy.      Interval hx     Creatinine improved and around her baseline  Bps controlled  Now off IVF- states she is feeling much better and was able to tolerate a

## 2025-02-16 NOTE — PROGRESS NOTES
[facility]    2/16/2025 8:25 AM        Name: Renita Brasher .              Admitted: 2/13/2025  Primary Care Provider: Enedina Redd APN (Tel: [phone])    No chest pain n/v    Current Medications  ondansetron (ZOFRAN) injection 4 mg, Q6H PRN  guaiFENesin-dextromethorphan (ROBITUSSIN DM) 100-10 MG/5ML syrup 5 mL, Q4H PRN  loperamide (IMODIUM) capsule 2 mg, 4x Daily PRN  0.9 % sodium chloride infusion, Continuous  amLODIPine (NORVASC) tablet 5 mg, Daily  atorvastatin (LIPITOR) tablet 40 mg, Nightly  metoprolol tartrate (LOPRESSOR) tablet 25 mg, BID  sodium chloride flush 0.9 % injection 5-40 mL, 2 times per day  sodium chloride flush 0.9 % injection 5-40 mL, PRN  0.9 % sodium chloride infusion, PRN  enoxaparin Sodium (LOVENOX) injection 30 mg, BID  polyethylene glycol (GLYCOLAX) packet 17 g, Daily PRN  acetaminophen (TYLENOL) tablet 650 mg, Q6H PRN   Or  acetaminophen (TYLENOL) suppository 650 mg, Q6H PRN        Objective:  /79   Pulse 77   Temp 98.6 °F (37 °C) (Oral)   Resp 18   Ht 1.702 m (5' 7\")   Wt 103.5 kg (228 lb 3.2 oz)   SpO2 95%   BMI 35.74 kg/m²     Intake/Output Summary (Last 24 hours) at 2/16/2025 0825  Last data filed at 2/15/2025 0937  Gross per 24 hour   Intake 240 ml   Output --   Net 240 ml      Wt Readings from Last 3 Encounters:   02/16/25 103.5 kg (228 lb 3.2 oz)   11/25/24 104.3 kg (230 lb)   11/12/24 108.4 kg (239 lb)       General appearance:  Appears comfortable. AAOx3  HEENT: atraumatic, Pupils equal, muscous membranes moist, no masses appreciated  Cardiovascular: Regular rate and rhythm no murmurs appreciated  Respiratory: CTAB no wheezing  Gastrointestinal: Abdomen soft, non-tender, BS+  EXT: no edema  Neurology: no gross focal deficts  Psychiatry: Appropriate affect. Not agitated  Skin: Warm, dry, no rashes appreciated    Labs and Tests:  CBC:   Recent Labs

## 2025-02-17 VITALS
SYSTOLIC BLOOD PRESSURE: 128 MMHG | RESPIRATION RATE: 17 BRPM | BODY MASS INDEX: 36.79 KG/M2 | HEART RATE: 76 BPM | DIASTOLIC BLOOD PRESSURE: 73 MMHG | TEMPERATURE: 98.4 F | WEIGHT: 234.4 LBS | OXYGEN SATURATION: 96 % | HEIGHT: 67 IN

## 2025-02-17 LAB
ANION GAP SERPL CALCULATED.3IONS-SCNC: 9 MMOL/L (ref 3–16)
BASOPHILS # BLD: 0 K/UL (ref 0–0.2)
BASOPHILS NFR BLD: 0.2 %
BUN SERPL-MCNC: 7 MG/DL (ref 7–20)
CALCIUM SERPL-MCNC: 8.7 MG/DL (ref 8.3–10.6)
CHLORIDE SERPL-SCNC: 107 MMOL/L (ref 99–110)
CO2 SERPL-SCNC: 24 MMOL/L (ref 21–32)
COPPER SERPL-MCNC: 110 UG/DL (ref 80–155)
CREAT SERPL-MCNC: 0.8 MG/DL (ref 0.6–1.2)
DEPRECATED RDW RBC AUTO: 15.4 % (ref 12.4–15.4)
EOSINOPHIL # BLD: 0 K/UL (ref 0–0.6)
EOSINOPHIL NFR BLD: 0.1 %
GFR SERPLBLD CREATININE-BSD FMLA CKD-EPI: 82 ML/MIN/{1.73_M2}
GLUCOSE BLD-MCNC: 98 MG/DL (ref 70–99)
GLUCOSE BLD-MCNC: 99 MG/DL (ref 70–99)
GLUCOSE SERPL-MCNC: 105 MG/DL (ref 70–99)
HCT VFR BLD AUTO: 32.7 % (ref 36–48)
HGB BLD-MCNC: 10.5 G/DL (ref 12–16)
LYMPHOCYTES # BLD: 2 K/UL (ref 1–5.1)
LYMPHOCYTES NFR BLD: 16.3 %
MCH RBC QN AUTO: 25.9 PG (ref 26–34)
MCHC RBC AUTO-ENTMCNC: 32.2 G/DL (ref 31–36)
MCV RBC AUTO: 80.3 FL (ref 80–100)
MONOCYTES # BLD: 0.4 K/UL (ref 0–1.3)
MONOCYTES NFR BLD: 3.2 %
NEUTROPHILS # BLD: 10 K/UL (ref 1.7–7.7)
NEUTROPHILS NFR BLD: 80.2 %
PATH INTERP BLD-IMP: NORMAL
PERFORMED ON: NORMAL
PERFORMED ON: NORMAL
PLATELET # BLD AUTO: 89 K/UL (ref 135–450)
PMV BLD AUTO: 9.9 FL (ref 5–10.5)
POTASSIUM SERPL-SCNC: 3.8 MMOL/L (ref 3.5–5.1)
RBC # BLD AUTO: 4.07 M/UL (ref 4–5.2)
SODIUM SERPL-SCNC: 140 MMOL/L (ref 136–145)
WBC # BLD AUTO: 12.5 K/UL (ref 4–11)
ZINC SERPL-MCNC: 49.6 UG/DL (ref 60–120)

## 2025-02-17 PROCEDURE — 80048 BASIC METABOLIC PNL TOTAL CA: CPT

## 2025-02-17 PROCEDURE — 2500000003 HC RX 250 WO HCPCS: Performed by: INTERNAL MEDICINE

## 2025-02-17 PROCEDURE — 36415 COLL VENOUS BLD VENIPUNCTURE: CPT

## 2025-02-17 PROCEDURE — 6370000000 HC RX 637 (ALT 250 FOR IP): Performed by: INTERNAL MEDICINE

## 2025-02-17 PROCEDURE — 85025 COMPLETE CBC W/AUTO DIFF WBC: CPT

## 2025-02-17 PROCEDURE — 99232 SBSQ HOSP IP/OBS MODERATE 35: CPT | Performed by: HOSPITALIST

## 2025-02-17 RX ORDER — LISINOPRIL 10 MG/1
10 TABLET ORAL DAILY
Status: DISCONTINUED | OUTPATIENT
Start: 2025-02-17 | End: 2025-02-17 | Stop reason: HOSPADM

## 2025-02-17 RX ADMIN — AMLODIPINE BESYLATE 5 MG: 5 TABLET ORAL at 10:01

## 2025-02-17 RX ADMIN — METOPROLOL TARTRATE 25 MG: 25 TABLET, FILM COATED ORAL at 10:01

## 2025-02-17 RX ADMIN — SODIUM CHLORIDE, PRESERVATIVE FREE 10 ML: 5 INJECTION INTRAVENOUS at 10:03

## 2025-02-17 NOTE — PROGRESS NOTES
Nephrology Note                                                                                                                                                                                                                                                                                                                                                               Office : [phone]     Fax :[phone]    Patient's Name: Renita Brasher  11:57 AM  2/17/2025    Reason for Consult:  YEHUDA  Requesting Physician:  Enedina Redd APN  Chief Complaint:    Chief Complaint   Patient presents with    Diarrhea     Patient arrives through triage with complaints of diarrhea. Reports that it has been ongoing for several days. Reports seeing PCP and being negative for covid/flu but continues to have symptoms.        Assessment/Plan     # Influenza A bronchitis, viral prodrome and acute systemic sickness    # YEHUDA, non-oliguric   -RESOLVED   - Creatinine improving and now back to baseline s/p IVF  - Likely pre-renal from acute sickness, diarrhea   - off IVF  - Electrolytes and volume stable     # HTN  - Well controlled  - Please discharge her on Lisinopril and Metoprolol  - Stop Amlodipine   - Monitor       History of Present Ilness:    Renita Brasher is a 65 y.o. female  who presents to the emergency department with profuse watery diarrhea ongoing since Sunday.  Patient reports about 5 episodes per day of profuse watery diarrhea anytime she goes to urinate.  Also with cough and congestion.  Was seen by her primary care doctor a few days ago, tested negative for COVID and flu at that time.  She is not having abdominal discomfort with this.  No vomiting.  Is having chills. Has been in remission in the setting of colon cancer for at least 25 years. Is not currently on antibiotic therapy.      Interval hx     Creatinine improved and around her baseline  Bps controlled  Now off IVF- states she is feeling much better and was able to

## 2025-02-17 NOTE — FLOWSHEET NOTE
Patient discharged home with , IV taken out, patient refused to wait to be transported out in wheelchair to car so she walked out to car with all belongings.

## 2025-02-17 NOTE — PLAN OF CARE
Problem: Chronic Conditions and Co-morbidities  Goal: Patient's chronic conditions and co-morbidity symptoms are monitored and maintained or improved  Outcome: Progressing     Problem: Discharge Planning  Goal: Discharge to home or other facility with appropriate resources  Outcome: Progressing     Problem: Safety - Adult  Goal: Free from fall injury  Outcome: Progressing     Problem: Respiratory - Adult  Goal: Achieves optimal ventilation and oxygenation  Outcome: Progressing     Problem: Cardiovascular - Adult  Goal: Maintains optimal cardiac output and hemodynamic stability  Outcome: Progressing  Goal: Absence of cardiac dysrhythmias or at baseline  Outcome: Progressing       Problem: Metabolic/Fluid and Electrolytes - Adult  Goal: Electrolytes maintained within normal limits  Outcome: Progressing  Goal: Hemodynamic stability and optimal renal function maintained  Outcome: Progressing  Goal: Glucose maintained within prescribed range  Outcome: Progressing

## 2025-02-17 NOTE — PROGRESS NOTES
Patients head to toe assessment completed. Vital signs WNL.  call light within reach. Scheduled medications given per MAR. PRN Robitussin given for cough. Patient denies any pain at the moment. Patient up in a chair

## 2025-02-18 NOTE — DISCHARGE SUMMARY
Hospital Medicine Discharge Summary    Patient: Renita Brasher     Gender: female  : 1960   Age: 65 y.o.  MRN: [id number]    Admitting Physician: Zhanna Ngo MD  Discharge Physician: Zhanna Ngo MD     Code Status: Prior     Admit Date: 2025   Discharge Date: 2025      Disposition:  Home  Time spent arranging discharge: 31 minutes    Discharge Diagnoses:    Active Hospital Problems    Diagnosis Date Noted    Hypertension [I10] 2022     Priority: Medium    YEHUDA (acute kidney injury) (HCC) [N17.9] 2025    Flu [J11.1] 2025    Leukopenia [D72.819] 2025    Dehydration [E86.0] 2025    Abdominal pain, vomiting, and diarrhea [R10.9, R11.10, R19.7] 2025         Condition at Discharge:  Stable    Hospital Course:   Patient admitted to hospital with YEHUDA secondary to diarrhea secondary to influenza A treated with IV fluids diarrhea improved YEHUDA improved patient pancytopenia status post filgrastim likely secondary to YEHUDA this improved cleared for discharge and will follow-up with PCP for repeat CBC,    Discharge Exam:    /73   Pulse 76   Temp 98.4 °F (36.9 °C) (Oral)   Resp 17   Ht 1.702 m (5' 7.01\")   Wt 106.3 kg (234 lb 6.4 oz)   SpO2 96%   BMI 36.70 kg/m²   General appearance:  Appears comfortable. AAOx3  HEENT: atraumatic, Pupils equal, muscous membranes moist, no masses appreciated  Cardiovascular: Regular rate and rhythm no murmurs appreciated  Respiratory: CTAB no wheezing  Gastrointestinal: Abdomen soft, non-tender, BS+  EXT: no edema  Neurology: no gross focal deficts  Psychiatry: Appropriate affect. Not agitated  Skin: Warm, dry, no rashes appreciated    Discharge Medications:   Discharge Medication List as of 2025 12:53 PM        Discharge Medication List as of 2025 12:53 PM        Discharge Medication List as of 2025 12:53 PM        CONTINUE these medications which have NOT CHANGED    Details   !! Multiple

## 2025-03-15 PROBLEM — E86.0 DEHYDRATION: Status: RESOLVED | Noted: 2025-02-13 | Resolved: 2025-03-15

## 2025-03-25 ENCOUNTER — HOSPITAL ENCOUNTER (OUTPATIENT)
Dept: WOMENS IMAGING | Age: 65
Discharge: HOME | End: 2025-03-25
Payer: COMMERCIAL

## 2025-03-25 VITALS — HEIGHT: 66 IN | WEIGHT: 230 LBS | BODY MASS INDEX: 36.96 KG/M2

## 2025-03-25 DIAGNOSIS — Z12.31 BREAST CANCER SCREENING BY MAMMOGRAM: ICD-10-CM

## 2025-03-25 PROCEDURE — 77067 SCR MAMMO BI INCL CAD: CPT

## 2025-03-26 ENCOUNTER — TELEPHONE (OUTPATIENT)
Dept: WOMENS IMAGING | Age: 65
End: 2025-03-26

## 2025-04-09 ENCOUNTER — HOSPITAL ENCOUNTER (OUTPATIENT)
Dept: ULTRASOUND IMAGING | Age: 65
Discharge: HOME | End: 2025-04-09
Payer: COMMERCIAL

## 2025-04-09 ENCOUNTER — HOSPITAL ENCOUNTER (OUTPATIENT)
Dept: WOMENS IMAGING | Age: 65
Discharge: HOME | End: 2025-04-09
Payer: COMMERCIAL

## 2025-04-09 ENCOUNTER — TELEPHONE (OUTPATIENT)
Dept: WOMENS IMAGING | Age: 65
End: 2025-04-09

## 2025-04-09 DIAGNOSIS — R92.8 ABNORMAL MAMMOGRAM: ICD-10-CM

## 2025-04-09 PROCEDURE — 76642 ULTRASOUND BREAST LIMITED: CPT

## 2025-04-09 PROCEDURE — G0279 TOMOSYNTHESIS, MAMMO: HCPCS

## 2025-04-09 NOTE — TELEPHONE ENCOUNTER
Imaging Navigator reviewed pre-procedure ultrasound guided breast biopsy patient education information in person and gave written instructions.  Reviewed medications and nothing to hold.  Patient should take all other medications as prescribed.  Patient can eat and drink as normal prior to the procedure.  Be sure to wear a bra with good support and a two piece outfit for comfort.  Patient can bring someone with you but you can also drive yourself.  Plan on being at the breast center for 2-2 and a half hours.  Reviewed the process of a ultrasound biopsy.  The skin is cleaned and a local anesthetic is given to numb the area.  A small skin nick is made for the biopsy needle and then tissue samples are taken.  A tiny marker is then placed inside your breast at the site of the biopsy for future reference.    Pressure is then held on the biopsy site to stop bleeding and steri strips, bandage and waterproof dressing is applied.   A mammogram is then done to validate the tissue marker.  The tissue sample is sent to pathology. Results will come back in 2-3 business days and sent to your referring physician.  Either they or the nurse navigator will call you with the results and recommended follow up needed.  Patients states understanding.

## 2025-04-25 ENCOUNTER — HOSPITAL ENCOUNTER (OUTPATIENT)
Dept: ULTRASOUND IMAGING | Age: 65
Discharge: HOME | End: 2025-04-25
Payer: MEDICARE

## 2025-04-25 ENCOUNTER — HOSPITAL ENCOUNTER (OUTPATIENT)
Dept: WOMENS IMAGING | Age: 65
Discharge: HOME | End: 2025-04-25
Payer: MEDICARE

## 2025-04-25 DIAGNOSIS — N63.0 BREAST MASS IN FEMALE: ICD-10-CM

## 2025-04-25 DIAGNOSIS — R92.8 ABNORMAL MAMMOGRAM: ICD-10-CM

## 2025-04-25 PROCEDURE — 77065 DX MAMMO INCL CAD UNI: CPT

## 2025-04-25 PROCEDURE — 2709999900 MAM STEREO BREAST BX W LOC DEVICE 1ST LESION LEFT

## 2025-04-25 PROCEDURE — 76098 X-RAY EXAM SURGICAL SPECIMEN: CPT

## 2025-04-25 PROCEDURE — 6360000002 HC RX W HCPCS: Performed by: RADIOLOGY

## 2025-04-25 PROCEDURE — 76642 ULTRASOUND BREAST LIMITED: CPT

## 2025-04-25 RX ORDER — LIDOCAINE HYDROCHLORIDE AND EPINEPHRINE 10; 10 MG/ML; UG/ML
10 INJECTION, SOLUTION INFILTRATION; PERINEURAL ONCE
Status: DISCONTINUED | OUTPATIENT
Start: 2025-04-25 | End: 2025-04-25

## 2025-04-25 RX ORDER — LIDOCAINE HYDROCHLORIDE 10 MG/ML
5 INJECTION, SOLUTION EPIDURAL; INFILTRATION; INTRACAUDAL; PERINEURAL ONCE
Status: COMPLETED | OUTPATIENT
Start: 2025-04-25 | End: 2025-04-25

## 2025-04-25 RX ORDER — LIDOCAINE HYDROCHLORIDE AND EPINEPHRINE 10; 10 MG/ML; UG/ML
20 INJECTION, SOLUTION INFILTRATION; PERINEURAL ONCE
Status: COMPLETED | OUTPATIENT
Start: 2025-04-25 | End: 2025-04-25

## 2025-04-25 RX ORDER — LIDOCAINE HYDROCHLORIDE 10 MG/ML
5 INJECTION, SOLUTION INFILTRATION; PERINEURAL ONCE
Status: DISCONTINUED | OUTPATIENT
Start: 2025-04-25 | End: 2025-04-25

## 2025-04-25 RX ADMIN — LIDOCAINE HYDROCHLORIDE ANHYDROUS 5 ML: 10 INJECTION, SOLUTION INFILTRATION at 01:41

## 2025-04-25 RX ADMIN — LIDOCAINE HYDROCHLORIDE,EPINEPHRINE BITARTRATE 20 ML: 10; .01 INJECTION, SOLUTION INFILTRATION; PERINEURAL at 01:42

## 2025-04-25 ASSESSMENT — PAIN SCALES - GENERAL: PAINLEVEL_OUTOF10: 0

## 2025-04-25 NOTE — PROGRESS NOTES
Patient here for stereotact breast biopsy. NN reviewed the health history, allergies and medications. , Dmitri came with patient. Radiologist reviews procedure with patient, consent signed. Patient tolerates procedure well. Compression held. Site cleansed with chloraprep, steri strips and dry dressing applied. Ice pack in place. Reviewed discharge instructions with patient and signed copy. Patient verbalized understanding and agreed to contact Nurse Navigator with any questions. Patient A&Ox3, steady on feet and discharged home.

## 2025-04-28 ENCOUNTER — TELEPHONE (OUTPATIENT)
Dept: WOMENS IMAGING | Age: 65
End: 2025-04-28

## 2025-04-28 NOTE — TELEPHONE ENCOUNTER
Nurse Navigator reviewed results of breast biopsy which showed Breast and adipose tissue with focal fibrosis and chronic   inflammation on the pathology report.  Negative for atypia or malignancy.     Radiology and pathology are concordant. Short interval follow-up is  recommended post-biopsy with a left diagnostic mammogram and ultrasound in 6  months for post biopsy follow-up an to ensure correlation between the  mammographic and sonographic lesions.

## 2025-06-09 ENCOUNTER — TELEPHONE (OUTPATIENT)
Dept: CARDIOLOGY CLINIC | Age: 65
End: 2025-06-09

## 2025-06-09 NOTE — TELEPHONE ENCOUNTER
I notified pt that [facility] will call her with appt date/time. She is agreeable to waiting to hear from Anca. She prefers to be seen in [place].

## 2025-06-09 NOTE — TELEPHONE ENCOUNTER
Pt called to R/S appt they missed with [facility] pt would Preefer appt at 7:30 am or 8:00 am because the have to go to work.

## 2025-06-16 NOTE — TELEPHONE ENCOUNTER
Left message for pt to call back to schedule appt with [facility]. 7/8 at 745 is available and 7/10 at 730, 745, or 8 am are all available also.

## 2025-06-25 ENCOUNTER — TELEPHONE (OUTPATIENT)
Dept: CARDIOLOGY CLINIC | Age: 65
End: 2025-06-25

## 2025-06-25 NOTE — TELEPHONE ENCOUNTER
I called and spoke to Tawana.  Tawana was just wanting to follow up about the clearance request.  She was unaware that Katty had missed her appointment on 6/3/25.  I informed her that she has a follow up appointment scheduled on 7/8.  She said that was fine and they are not in any rush since surgery is not scheduled until 8/13/25.

## 2025-06-25 NOTE — TELEPHONE ENCOUNTER
CARDIAC CLEARANCE     What type of procedure are you having?  Left Totally Knee Replacement    Which physician is performing your procedure?  Dr. Tom Franco    When is your procedure scheduled for?   08/13    Where are you having this procedure?  [facility]    Are you taking Blood Thinners?  No   If so what? (Name/dose/frequesncy)  N/A   Does the surgeon want you to stop your blood thinner?  If so for how long?  N/A    Phone Number and Contact Name for Physicians office:  Tawana - [phone]    Fax number to send information:  [phone]

## 2025-07-08 ENCOUNTER — OFFICE VISIT (OUTPATIENT)
Dept: CARDIOLOGY CLINIC | Age: 65
End: 2025-07-08
Payer: COMMERCIAL

## 2025-07-08 VITALS
HEART RATE: 94 BPM | HEIGHT: 66 IN | BODY MASS INDEX: 36.17 KG/M2 | SYSTOLIC BLOOD PRESSURE: 142 MMHG | OXYGEN SATURATION: 99 % | DIASTOLIC BLOOD PRESSURE: 80 MMHG | WEIGHT: 225.1 LBS

## 2025-07-08 DIAGNOSIS — I45.10 RBBB: ICD-10-CM

## 2025-07-08 DIAGNOSIS — I25.10 CORONARY ARTERY CALCIFICATION SEEN ON CAT SCAN: Primary | ICD-10-CM

## 2025-07-08 DIAGNOSIS — G47.33 OSA (OBSTRUCTIVE SLEEP APNEA): ICD-10-CM

## 2025-07-08 DIAGNOSIS — I49.3 PVC (PREMATURE VENTRICULAR CONTRACTION): ICD-10-CM

## 2025-07-08 DIAGNOSIS — I77.89 ENLARGED AORTA: ICD-10-CM

## 2025-07-08 DIAGNOSIS — E78.2 MIXED HYPERLIPIDEMIA: ICD-10-CM

## 2025-07-08 DIAGNOSIS — I10 PRIMARY HYPERTENSION: ICD-10-CM

## 2025-07-08 PROCEDURE — 93000 ELECTROCARDIOGRAM COMPLETE: CPT | Performed by: INTERNAL MEDICINE

## 2025-07-08 PROCEDURE — 3075F SYST BP GE 130 - 139MM HG: CPT | Performed by: INTERNAL MEDICINE

## 2025-07-08 PROCEDURE — 3079F DIAST BP 80-89 MM HG: CPT | Performed by: INTERNAL MEDICINE

## 2025-07-08 PROCEDURE — 1123F ACP DISCUSS/DSCN MKR DOCD: CPT | Performed by: INTERNAL MEDICINE

## 2025-07-08 PROCEDURE — 99214 OFFICE O/P EST MOD 30 MIN: CPT | Performed by: INTERNAL MEDICINE

## 2025-07-08 RX ORDER — ATORVASTATIN CALCIUM 80 MG/1
80 TABLET, FILM COATED ORAL DAILY
Qty: 90 TABLET | Refills: 3 | Status: SHIPPED | OUTPATIENT
Start: 2025-07-08

## 2025-07-08 RX ORDER — CARVEDILOL 6.25 MG/1
6.25 TABLET ORAL 2 TIMES DAILY
Qty: 180 TABLET | Refills: 3 | Status: SHIPPED | OUTPATIENT
Start: 2025-07-08

## 2025-07-08 NOTE — PROGRESS NOTES
years\".  Last LDL was 96.  She is fairly active but has some limitations due to knee pain.  She denies any chest pain.  She does go up 2 flights of stairs at times.  She may have some knee pain with this and mild shortness of breath.    Past Medical History:   has a past medical history of Cancer (HCC), Colon cancer (HCC), Diabetes mellitus (HCC), and Hypertension.    Surgical History:   has a past surgical history that includes Tunneled venous port placement; Cholecystectomy; Colon surgery (2009); Hysterectomy; US BREAST BIOPSY W LOC DEVICE 1ST LESION RIGHT (Right, 3/1/2023); and [facility] STEREO BREAST BX W LOC DEVICE 1ST LESION LEFT (Left, 4/25/2025).     Social History:  Social History     Tobacco Use    Smoking status: Never     Passive exposure: Past    Smokeless tobacco: Never   Substance Use Topics    Alcohol use: No        Family History:  family history includes Breast Cancer in her maternal aunt, maternal aunt, maternal aunt, maternal aunt, and mother; COPD in her brother; Emphysema in her brother; No Known Problems in her brother.     Allergies:  Patient has no known allergies.     Home Medications:  Prior to Visit Medications    Medication Sig Taking? Authorizing Provider   atorvastatin (LIPITOR) 80 MG tablet Take 1 tablet by mouth daily Yes Edgar Bradshaw MD   carvedilol (COREG) 6.25 MG tablet Take 1 tablet by mouth 2 times daily Yes Edgar Bradshaw MD   Multiple Vitamins-Minerals (HAIR SKIN & NAILS ADVANCED) TABS Take 2 tablets by mouth daily Yes Paula Mccall MD   lisinopril (PRINIVIL;ZESTRIL) 40 MG tablet Take 1 tablet by mouth daily Yes Paula Mccall MD   Vitamin D, Ergocalciferol, 89958 UNIT CAPS Take by mouth One tablet by mouth weekly for four weeks then once a month Yes Paula Mccall MD       [x] Medications and dosages reviewed.    ROS:  [x]Full ROS obtained and negative except as mentioned in HPI      PHYSICAL EXAMINATION:  Vitals:    07/08/25 0753 07/08/25 0807   BP: (!)

## 2025-07-08 NOTE — PATIENT INSTRUCTIONS
Stop Metoprolol  Start Carvedilol 6.25 mg twice a day  Monitor BP at home- call if numbers are = or >140's/90's  Increase Lipitor to 80 mg daily  Coronary calcium score  Follow up first week of August

## 2025-07-12 ENCOUNTER — HOSPITAL ENCOUNTER (OUTPATIENT)
Dept: CT IMAGING | Age: 65
Discharge: HOME | End: 2025-07-12
Attending: INTERNAL MEDICINE
Payer: COMMERCIAL

## 2025-07-12 DIAGNOSIS — E78.2 MIXED HYPERLIPIDEMIA: ICD-10-CM

## 2025-07-12 DIAGNOSIS — I25.10 CORONARY ARTERY CALCIFICATION SEEN ON CAT SCAN: ICD-10-CM

## 2025-07-12 PROCEDURE — 75571 CT HRT W/O DYE W/CA TEST: CPT

## 2025-07-14 ENCOUNTER — RESULTS FOLLOW-UP (OUTPATIENT)
Dept: CARDIOLOGY CLINIC | Age: 65
End: 2025-07-14

## 2025-07-14 NOTE — TELEPHONE ENCOUNTER
Pt had CT cardiac calcium score done.   Per [facility]- CT calcium score of 56. Recommendation is to continue on Lipitor 80 mg.    Discussed with pt. She verbalized understanding.

## 2025-08-05 ENCOUNTER — OFFICE VISIT (OUTPATIENT)
Dept: CARDIOLOGY CLINIC | Age: 65
End: 2025-08-05
Payer: COMMERCIAL

## 2025-08-05 ENCOUNTER — HOSPITAL ENCOUNTER (OUTPATIENT)
Age: 65
Discharge: HOME | End: 2025-08-05
Payer: COMMERCIAL

## 2025-08-05 VITALS
DIASTOLIC BLOOD PRESSURE: 82 MMHG | HEIGHT: 66 IN | HEART RATE: 77 BPM | OXYGEN SATURATION: 97 % | BODY MASS INDEX: 36.32 KG/M2 | WEIGHT: 226 LBS | SYSTOLIC BLOOD PRESSURE: 136 MMHG

## 2025-08-05 DIAGNOSIS — E78.2 MIXED HYPERLIPIDEMIA: ICD-10-CM

## 2025-08-05 DIAGNOSIS — I49.3 PVC (PREMATURE VENTRICULAR CONTRACTION): Primary | ICD-10-CM

## 2025-08-05 DIAGNOSIS — I45.10 RBBB: ICD-10-CM

## 2025-08-05 DIAGNOSIS — G47.33 OSA (OBSTRUCTIVE SLEEP APNEA): ICD-10-CM

## 2025-08-05 DIAGNOSIS — Z01.818 PRE-OP EXAM: ICD-10-CM

## 2025-08-05 DIAGNOSIS — I10 PRIMARY HYPERTENSION: ICD-10-CM

## 2025-08-05 LAB
ALT SERPL-CCNC: 14 U/L (ref 10–40)
AST SERPL-CCNC: 24 U/L (ref 15–37)
CHOLEST SERPL-MCNC: 138 MG/DL (ref 0–199)
HDLC SERPL-MCNC: 38 MG/DL (ref 40–60)
LDL CHOLESTEROL: 80 MG/DL
TRIGL SERPL-MCNC: 100 MG/DL (ref 0–150)
VLDLC SERPL CALC-MCNC: 20 MG/DL

## 2025-08-05 PROCEDURE — 3075F SYST BP GE 130 - 139MM HG: CPT | Performed by: INTERNAL MEDICINE

## 2025-08-05 PROCEDURE — 84460 ALANINE AMINO (ALT) (SGPT): CPT

## 2025-08-05 PROCEDURE — 36415 COLL VENOUS BLD VENIPUNCTURE: CPT

## 2025-08-05 PROCEDURE — 93000 ELECTROCARDIOGRAM COMPLETE: CPT | Performed by: INTERNAL MEDICINE

## 2025-08-05 PROCEDURE — 99214 OFFICE O/P EST MOD 30 MIN: CPT | Performed by: INTERNAL MEDICINE

## 2025-08-05 PROCEDURE — 80061 LIPID PANEL: CPT

## 2025-08-05 PROCEDURE — 3079F DIAST BP 80-89 MM HG: CPT | Performed by: INTERNAL MEDICINE

## 2025-08-05 PROCEDURE — 84450 TRANSFERASE (AST) (SGOT): CPT

## 2025-08-05 PROCEDURE — 1123F ACP DISCUSS/DSCN MKR DOCD: CPT | Performed by: INTERNAL MEDICINE

## 2025-08-06 ENCOUNTER — RESULTS FOLLOW-UP (OUTPATIENT)
Dept: CARDIOLOGY CLINIC | Age: 65
End: 2025-08-06

## 2025-08-06 DIAGNOSIS — I25.10 CORONARY ARTERY CALCIFICATION SEEN ON CAT SCAN: Primary | ICD-10-CM

## 2025-08-06 DIAGNOSIS — E78.2 MIXED HYPERLIPIDEMIA: ICD-10-CM

## 2025-08-06 RX ORDER — EZETIMIBE 10 MG/1
10 TABLET ORAL DAILY
Qty: 90 TABLET | Refills: 4 | Status: SHIPPED | OUTPATIENT
Start: 2025-08-06